# Patient Record
Sex: FEMALE | Race: WHITE | NOT HISPANIC OR LATINO | Employment: UNEMPLOYED | URBAN - NONMETROPOLITAN AREA
[De-identification: names, ages, dates, MRNs, and addresses within clinical notes are randomized per-mention and may not be internally consistent; named-entity substitution may affect disease eponyms.]

---

## 2016-06-22 LAB — EXTERNAL HEPATITIS B SURFACE ANTIGEN: NEGATIVE

## 2017-01-17 ENCOUNTER — HOSPITAL ENCOUNTER (OUTPATIENT)
Facility: HOSPITAL | Age: 27
Discharge: HOME | End: 2017-01-17
Attending: OBSTETRICS & GYNECOLOGY | Admitting: OBSTETRICS & GYNECOLOGY

## 2017-01-17 VITALS
BODY MASS INDEX: 36.47 KG/M2 | OXYGEN SATURATION: 99 % | HEART RATE: 105 BPM | TEMPERATURE: 98.5 F | DIASTOLIC BLOOD PRESSURE: 75 MMHG | WEIGHT: 193 LBS | RESPIRATION RATE: 20 BRPM | SYSTOLIC BLOOD PRESSURE: 117 MMHG

## 2017-01-17 LAB
ALBUMIN SERPL-MCNC: 3.5 G/DL (ref 3.5–5)
ALBUMIN/GLOB SERPL: 1.2 G/DL (ref 1.5–2.5)
ALP SERPL-CCNC: 101 U/L (ref 46–116)
ALT SERPL W P-5'-P-CCNC: 8 U/L (ref 10–36)
ANION GAP SERPL CALCULATED.3IONS-SCNC: 12.1 MMOL/L (ref 3.6–11.2)
AST SERPL-CCNC: 15 U/L (ref 10–30)
BASOPHILS # BLD AUTO: 0.02 10*3/MM3 (ref 0–0.3)
BASOPHILS NFR BLD AUTO: 0.2 % (ref 0–2)
BILIRUB SERPL-MCNC: 0.7 MG/DL (ref 0.2–1.8)
BUN BLD-MCNC: 5 MG/DL (ref 7–21)
BUN/CREAT SERPL: 11.1 (ref 7–25)
CALCIUM SPEC-SCNC: 9 MG/DL (ref 7.7–10)
CHLORIDE SERPL-SCNC: 106 MMOL/L (ref 99–112)
CO2 SERPL-SCNC: 19.9 MMOL/L (ref 24.3–31.9)
CREAT BLD-MCNC: 0.45 MG/DL (ref 0.43–1.29)
DEPRECATED RDW RBC AUTO: 41.5 FL (ref 37–54)
EOSINOPHIL # BLD AUTO: 0.12 10*3/MM3 (ref 0–0.7)
EOSINOPHIL NFR BLD AUTO: 1.2 % (ref 0–5)
ERYTHROCYTE [DISTWIDTH] IN BLOOD BY AUTOMATED COUNT: 14.2 % (ref 11.5–14.5)
EXTERNAL GROUP B STREP ANTIGEN: NEGATIVE
GFR SERPL CREATININE-BSD FRML MDRD: >150 ML/MIN/1.73
GLOBULIN UR ELPH-MCNC: 3 GM/DL
GLUCOSE BLD-MCNC: 90 MG/DL (ref 70–110)
HCT VFR BLD AUTO: 34.3 % (ref 37–47)
HGB BLD-MCNC: 10.9 G/DL (ref 12–16)
IMM GRANULOCYTES # BLD: 0.04 10*3/MM3 (ref 0–0.03)
IMM GRANULOCYTES NFR BLD: 0.4 % (ref 0–0.5)
LYMPHOCYTES # BLD AUTO: 1.76 10*3/MM3 (ref 1–3)
LYMPHOCYTES NFR BLD AUTO: 17.5 % (ref 21–51)
MCH RBC QN AUTO: 26.3 PG (ref 27–33)
MCHC RBC AUTO-ENTMCNC: 31.8 G/DL (ref 33–37)
MCV RBC AUTO: 82.7 FL (ref 80–94)
MONOCYTES # BLD AUTO: 0.91 10*3/MM3 (ref 0.1–0.9)
MONOCYTES NFR BLD AUTO: 9.1 % (ref 0–10)
NEUTROPHILS # BLD AUTO: 7.2 10*3/MM3 (ref 1.4–6.5)
NEUTROPHILS NFR BLD AUTO: 71.6 % (ref 30–70)
OSMOLALITY SERPL CALC.SUM OF ELEC: 272.5 MOSM/KG (ref 273–305)
PLATELET # BLD AUTO: 212 10*3/MM3 (ref 130–400)
PMV BLD AUTO: 10.5 FL (ref 6–10)
POTASSIUM BLD-SCNC: 3.7 MMOL/L (ref 3.5–5.3)
PROT SERPL-MCNC: 6.5 G/DL (ref 6–8)
RBC # BLD AUTO: 4.15 10*6/MM3 (ref 4.2–5.4)
SODIUM BLD-SCNC: 138 MMOL/L (ref 135–153)
URATE SERPL-MCNC: 3.7 MG/DL (ref 2.4–5.7)
WBC NRBC COR # BLD: 10.05 10*3/MM3 (ref 4.5–12.5)

## 2017-01-17 PROCEDURE — 85025 COMPLETE CBC W/AUTO DIFF WBC: CPT | Performed by: OBSTETRICS & GYNECOLOGY

## 2017-01-17 PROCEDURE — 80053 COMPREHEN METABOLIC PANEL: CPT | Performed by: OBSTETRICS & GYNECOLOGY

## 2017-01-17 PROCEDURE — 59025 FETAL NON-STRESS TEST: CPT

## 2017-01-17 PROCEDURE — 84550 ASSAY OF BLOOD/URIC ACID: CPT | Performed by: OBSTETRICS & GYNECOLOGY

## 2017-01-17 PROCEDURE — G0463 HOSPITAL OUTPT CLINIC VISIT: HCPCS

## 2017-01-17 NOTE — NON STRESS TEST
Rachel Escobar, a  at 35w6d with an CONCEPCION of 2/15/2017, by Ultrasound, was seen at [facility] LABOR DELIVERY for a nonstress test.    Chief Complaint   Patient presents with   • Non-stress Test     episode of visual changes, slurred speech and mixed up speech. some numbness on R side.  Episode lasted approx 35 minutes.       Interpretation A  Nonstress Test Interpretation A: Reactive (17 1640 : Tricia Velasquez RN)  Comments A: CONFIRMED WITH LEÓN LEVINE RN AND DR. TALAVERA INFORMED OF FETAL TACHYCARDIA. (17 1640 : Tricia Velasquez RN)

## 2017-01-17 NOTE — IP AVS SNAPSHOT
AFTER VISIT SUMMARY             Rachel Escobar           About your hospitalization     You were discharged on:  January 17, 2017 You last received care in the:  [facility] LABOR DELIVERY     Unit phone number:  [phone]       Medications    If you or your caregiver advised us that you are currently taking a medication and that medication is marked below as “Resume”, this simply indicates that we have reviewed those medications to make sure our new therapy recommendations do not interfere.  If you have concerns about medications other than those new ones which we are prescribing today, please consult the physician who prescribed them (or your primary physician).  Our review of your home medications is not meant to indicate that we are directing their use.             Your Medications      Notice     You have not been prescribed any medications.             Instructions for After Discharge         Relevant Prenatal Information          Facts About Your Prenatal Visit (All Dating Information Is Approximate)     Due Date How Far Along Am I? Weight Gain Since Prior Visit (12/20/2016)       2/15/2017 35 weeks 6 days -1 lb (-0.5 kg)       Vitals     Blood Pressure Weight                117/75 193 lb (87.5 kg)           Summary of Your Hospitalization        Reason for Hospitalization     Your primary diagnosis was:  Not on File      Care Providers     Provider Service Role Specialty    Saniya Be, DO Obstetrics Attending Provider Obstetrics and Gynecology      Your Allergies  Date Reviewed: 1/17/2017    No active allergies      Codingpeople Signup     [facility] Codingpeople allows you to send messages to your doctor, view your test results, renew your prescriptions, schedule appointments, and more. To sign up, go to MDdatacor and click on the Sign Up Now link in the New User? box. Enter your Codingpeople Activation Code exactly as it appears below along with the last four digits of your  Social Security Number and your Date of Birth () to complete the sign-up process. If you do not sign up before the expiration date, you must request a new code.    Met[employer]x Activation Code: [email or web address]  Expires: 2017  6:49 PM    If you have questions, you can email [email or web address] or call [phone] to talk to our Met[employer] staff. Remember, Metconnex is NOT to be used for urgent needs. For medical emergencies, dial 911.          Information Regarding Fetal Kick Counts     Fetal Movement Counts  Patient Name: __________________________________________________   Patient Due Date: ____________________  Performing a fetal movement count is highly recommended in high-risk pregnancies, but it is good for every pregnant woman to do. Your caregiver may ask you to start counting fetal movements at 28 weeks of the pregnancy. Fetal movements often increase:  · After eating a full meal.  · After physical activity.  · After eating or drinking something sweet or cold.  · At rest.  Pay attention to when you feel the baby is most active. This will help you notice a pattern of your baby's sleep and wake cycles and what factors contribute to an increase in fetal movement. It is important to perform a fetal movement count at the same time each day when your baby is normally most active.    HOW TO COUNT FETAL MOVEMENTS  1. Find a quiet and comfortable area to sit or lie down on your left side. Lying on your left side provides the best blood and oxygen circulation to your baby.  2. Write down the day and time on a sheet of paper or in a journal.  3. Start counting kicks, flutters, swishes, rolls, or jabs in a 2 hour period. You should feel at least 10 movements within 2 hours.  4. If you do not feel 10 movements in 2 hours, wait 2-3 hours and count again. Look for a change in the pattern or not enough counts in 2 hours.  SEEK MEDICAL CARE IF:  · You feel less than 10 counts in 2 hours, tried twice.  · There is no  movement in over an hour.  · The pattern is changing or taking longer each day to reach 10 counts in 2 hours.  · You feel the baby is not moving as he or she usually does.    Date  Movements  Start Time  Finish Time    Date  Movements  Start Time  Finish Time    Date  Movements  Start Time  Finish Time    Date  Movements  Start Time  Finish Time    Date  Movements  Start Time  Finish Time    Date  Movements  Start Time  Finish Time      Date  Movements  Start Time  Finish Time    Date  Movements  Start Time  Finish Time    Date  Movements  Start Time  Finish Time    Date  Movements  Start Time  Finish Time    Date  Movements  Start Time  Finish Time    Date  Movements  Start Time  Finish Time      Date  Movements  Start Time  Finish Time    Date  Movements  Start Time  Finish Time    Date  Movements  Start Time  Finish Time    Date  Movements  Start Time  Finish Time    Date  Movements  Start Time  Finish Time    Date  Movements  Start Time  Finish Time    Date  Movements  Start Time  Finish Time    Date  Movements  Start Time  Finish Time    Date  Movements  Start Time  Finish Time    Date  Movements  Start Time  Finish Time    Date  Movements  Start Time  Finish Time    Date  Movements  Start Time  Finish Time      Document Released: 01/17/2008   Document Revised: 12/04/2013   Document Reviewed: 10/14/2013    ExitCare® Patient Information ©2015 [employer], [facility]. This information is not intended to replace advice given to you by your health care provider. Make sure you discuss any questions you have with your health care provider.             SYMPTOMS OF A STROKE    Call 911 or have someone take you to the Emergency Department if you have any of the following:    · Sudden numbness or weakness of your face, arm or leg especially on one side of the body  · Sudden confusion, diffiiculty speaking or trouble understanding   · Changes in your vision or loss of sight in one eye  · Sudden severe headache with no known  cause  · sudden dizziness, trouble walking, loss of balance or coordination    It is important to seek emergency care right away if you have further stroke symptoms. If you get emergency help quickly, the powerful clot-dissolving medicines can reduce the disabilities caused by a stroke.     For more information:    American Stroke Association  8-290-9-STROKE  www.strokeassociation.org           IF YOU SMOKE OR USE TOBACCO PLEASE READ THE FOLLOWING:    Why is smoking bad for me?  Smoking increases the risk of heart disease, lung disease, vascular disease, stroke, and cancer.     If you smoke, STOP!    If you would like more information on quitting smoking, please visit the [employer] website: [email or web address]/healthier-[email or web address]   This link will provide additional resources including the QUIT line and the Beat the Pack support groups.     For more information:    American Cancer Society  (620) [phone]    American Heart Association  8-443-574-4853               YOU ARE THE MOST IMPORTANT FACTOR IN YOUR RECOVERY.     Follow all instructions carefully.     I have reviewed my discharge instructions with my nurse, including the following information, if applicable:     Information about my illness and diagnosis   Follow up appointments (including lab draws)   Wound Care   Equipment Needs   Medications (new and continuing) along with side effects   Preventative information such as vaccines and smoking cessations   Diet   Pain   I know when to contact my Doctor's office or seek emergency care      I want my nurse to describe the side effects of my medications: YES NO   If the answer is no, I understand the side effects of my medications: YES NO   My nurse described the side effects of my medications in a way that I could understand: YES NO   I have taken my personal belongings and my own medications with me at discharge: YES NO            Should a home visit be schedule with you:  a  notification from your provider will be made prior to the visit.  If you have any questions or concerns about the visit, contact your provider.      I have received this information and my questions have been answered. I have discussed any concerns I see with this plan with the nurse or physician. I understand these instructions.    Signature of Patient or Responsible Person: _____________________________________    Date: _________________  Time: __________________    Signature of Healthcare Provider: _______________________________________  Date: _________________  Time: __________________

## 2017-01-17 NOTE — DISCHARGE INSTRUCTIONS
Go immediately to the closest Emergency Department if you begin experiencing symptoms like you had earlier today.

## 2017-01-26 ENCOUNTER — HOSPITAL ENCOUNTER (OUTPATIENT)
Facility: HOSPITAL | Age: 27
Discharge: HOME | End: 2017-01-26
Attending: OBSTETRICS & GYNECOLOGY | Admitting: OBSTETRICS & GYNECOLOGY

## 2017-01-26 VITALS
HEIGHT: 61 IN | WEIGHT: 194 LBS | TEMPERATURE: 98.4 F | SYSTOLIC BLOOD PRESSURE: 124 MMHG | DIASTOLIC BLOOD PRESSURE: 69 MMHG | HEART RATE: 87 BPM | RESPIRATION RATE: 22 BRPM | BODY MASS INDEX: 36.63 KG/M2

## 2017-01-26 LAB — A1 MICROGLOB PLACENTAL VAG QL: NEGATIVE

## 2017-01-26 PROCEDURE — 84112 EVAL AMNIOTIC FLUID PROTEIN: CPT | Performed by: OBSTETRICS & GYNECOLOGY

## 2017-01-26 PROCEDURE — 59025 FETAL NON-STRESS TEST: CPT

## 2017-01-26 PROCEDURE — G0463 HOSPITAL OUTPT CLINIC VISIT: HCPCS

## 2017-01-26 NOTE — NON STRESS TEST
Rachel Escobar, a  at 37w1d with an CONCEPCION of 2/15/2017, by Ultrasound, was seen at [facility] LABOR DELIVERY for a nonstress test.    Chief Complaint   Patient presents with   • Non-stress Test     PT C/O OF LEAKING OF CLEAR FLUID.  PT DENIES ANY UC'S AND NO VAG BLEEDING.  PT HAS +FM.       Interpretation A  Nonstress Test Interpretation A: Reactive (172 : Sasha Boston RN)        VERIFIED BY TRACIE VALENCIA RN

## 2017-01-26 NOTE — IP AVS SNAPSHOT
AFTER VISIT SUMMARY             Rachel Escobar           About your hospitalization     You were discharged on:  January 26, 2017 You last received care in the:  [facility] LABOR DELIVERY     Unit phone number:  [phone]       Medications    If you or your caregiver advised us that you are currently taking a medication and that medication is marked below as “Resume”, this simply indicates that we have reviewed those medications to make sure our new therapy recommendations do not interfere.  If you have concerns about medications other than those new ones which we are prescribing today, please consult the physician who prescribed them (or your primary physician).  Our review of your home medications is not meant to indicate that we are directing their use.             Your Medications      Notice     You have not been prescribed any medications.               Your Medications      Notice     You have not been prescribed any medications.             Instructions for After Discharge         Relevant Prenatal Information          Facts About Your Prenatal Visit (All Dating Information Is Approximate)     Due Date How Far Along Am I? Weight Gain Since Prior Visit (1/17/2017)       2/15/2017 37 weeks 1 days 1 lb (0.5 kg)       Vitals     Blood Pressure Weight                124/69 194 lb (88 kg)           Summary of Your Hospitalization        Reason for Hospitalization     Your primary diagnosis was:  Not on File      Care Providers     Provider Service Role Specialty    Saniya Be, DO Obstetrics Attending Provider Obstetrics and Gynecology      Your Allergies  Date Reviewed: 1/26/2017    No active allergies      [email or web address] Signup     [facility] [email or web address] allows you to send messages to your doctor, view your test results, renew your prescriptions, schedule appointments, and more. To sign up, go to Fileblaze and click on the Sign Up Now link in the New User? box. Enter your  MyMundus Activation Code exactly as it appears below along with the last four digits of your Social Security Number and your Date of Birth () to complete the sign-up process. If you do not sign up before the expiration date, you must request a new code.    MyMundus Activation Code: [email or web address]  Expires: 2017  6:49 PM    If you have questions, you can email [email or web address] or call [phone] to talk to our MyMund[employer] staff. Remember, MyMundus is NOT to be used for urgent needs. For medical emergencies, dial 911.          Information Regarding Fetal Kick Counts     Fetal Movement Counts  Patient Name: __________________________________________________   Patient Due Date: ____________________  Performing a fetal movement count is highly recommended in high-risk pregnancies, but it is good for every pregnant woman to do. Your caregiver may ask you to start counting fetal movements at 28 weeks of the pregnancy. Fetal movements often increase:  · After eating a full meal.  · After physical activity.  · After eating or drinking something sweet or cold.  · At rest.  Pay attention to when you feel the baby is most active. This will help you notice a pattern of your baby's sleep and wake cycles and what factors contribute to an increase in fetal movement. It is important to perform a fetal movement count at the same time each day when your baby is normally most active.    HOW TO COUNT FETAL MOVEMENTS  1. Find a quiet and comfortable area to sit or lie down on your left side. Lying on your left side provides the best blood and oxygen circulation to your baby.  2. Write down the day and time on a sheet of paper or in a journal.  3. Start counting kicks, flutters, swishes, rolls, or jabs in a 2 hour period. You should feel at least 10 movements within 2 hours.  4. If you do not feel 10 movements in 2 hours, wait 2-3 hours and count again. Look for a change in the pattern or not enough counts in 2  hours.  SEEK MEDICAL CARE IF:  · You feel less than 10 counts in 2 hours, tried twice.  · There is no movement in over an hour.  · The pattern is changing or taking longer each day to reach 10 counts in 2 hours.  · You feel the baby is not moving as he or she usually does.    Date  Movements  Start Time  Finish Time    Date  Movements  Start Time  Finish Time    Date  Movements  Start Time  Finish Time    Date  Movements  Start Time  Finish Time    Date  Movements  Start Time  Finish Time    Date  Movements  Start Time  Finish Time      Date  Movements  Start Time  Finish Time    Date  Movements  Start Time  Finish Time    Date  Movements  Start Time  Finish Time    Date  Movements  Start Time  Finish Time    Date  Movements  Start Time  Finish Time    Date  Movements  Start Time  Finish Time      Date  Movements  Start Time  Finish Time    Date  Movements  Start Time  Finish Time    Date  Movements  Start Time  Finish Time    Date  Movements  Start Time  Finish Time    Date  Movements  Start Time  Finish Time    Date  Movements  Start Time  Finish Time    Date  Movements  Start Time  Finish Time    Date  Movements  Start Time  Finish Time    Date  Movements  Start Time  Finish Time    Date  Movements  Start Time  Finish Time    Date  Movements  Start Time  Finish Time    Date  Movements  Start Time  Finish Time      Document Released: 01/17/2008   Document Revised: 12/04/2013   Document Reviewed: 10/14/2013    ExitCare® Patient Information ©2015 [employer], [facility]. This information is not intended to replace advice given to you by your health care provider. Make sure you discuss any questions you have with your health care provider.             SYMPTOMS OF A STROKE    Call 911 or have someone take you to the Emergency Department if you have any of the following:    · Sudden numbness or weakness of your face, arm or leg especially on one side of the body  · Sudden confusion, diffiiculty speaking or trouble understanding    · Changes in your vision or loss of sight in one eye  · Sudden severe headache with no known cause  · sudden dizziness, trouble walking, loss of balance or coordination    It is important to seek emergency care right away if you have further stroke symptoms. If you get emergency help quickly, the powerful clot-dissolving medicines can reduce the disabilities caused by a stroke.     For more information:    American Stroke Association  5-016-7-STROKE  www.strokeassociation.org           IF YOU SMOKE OR USE TOBACCO PLEASE READ THE FOLLOWING:    Why is smoking bad for me?  Smoking increases the risk of heart disease, lung disease, vascular disease, stroke, and cancer.     If you smoke, STOP!    If you would like more information on quitting smoking, please visit the [employer] website: [email or web address]   This link will provide additional resources including the QUIT line and the Beat the Pack support groups.     For more information:    American Cancer Society  ([phone] 555-6100    American Heart Association  1-285.672.9043               YOU ARE THE MOST IMPORTANT FACTOR IN YOUR RECOVERY.     Follow all instructions carefully.     I have reviewed my discharge instructions with my nurse, including the following information, if applicable:     Information about my illness and diagnosis   Follow up appointments (including lab draws)   Wound Care   Equipment Needs   Medications (new and continuing) along with side effects   Preventative information such as vaccines and smoking cessations   Diet   Pain   I know when to contact my Doctor's office or seek emergency care      I want my nurse to describe the side effects of my medications: YES NO   If the answer is no, I understand the side effects of my medications: YES NO   My nurse described the side effects of my medications in a way that I could understand: YES NO   I have taken my personal belongings and my own  medications with me at discharge: YES NO            Should a home visit be schedule with you:  a notification from your provider will be made prior to the visit.  If you have any questions or concerns about the visit, contact your provider.      I have received this information and my questions have been answered. I have discussed any concerns I see with this plan with the nurse or physician. I understand these instructions.    Signature of Patient or Responsible Person: _____________________________________    Date: _________________  Time: __________________    Signature of Healthcare Provider: _______________________________________  Date: _________________  Time: __________________

## 2017-01-30 ENCOUNTER — LAB (OUTPATIENT)
Dept: LAB | Facility: HOSPITAL | Age: 27
End: 2017-01-30
Attending: OBSTETRICS & GYNECOLOGY

## 2017-01-30 ENCOUNTER — TRANSCRIBE ORDERS (OUTPATIENT)
Dept: ADMINISTRATIVE | Facility: HOSPITAL | Age: 27
End: 2017-01-30

## 2017-01-30 DIAGNOSIS — Z34.80 PRENATAL CARE, SUBSEQUENT PREGNANCY, UNSPECIFIED TRIMESTER: Primary | ICD-10-CM

## 2017-01-30 DIAGNOSIS — Z34.80 PRENATAL CARE, SUBSEQUENT PREGNANCY, UNSPECIFIED TRIMESTER: ICD-10-CM

## 2017-01-30 LAB
ALBUMIN SERPL-MCNC: 3.7 G/DL (ref 3.5–5)
ALP SERPL-CCNC: 130 U/L (ref 46–116)
ALT SERPL W P-5'-P-CCNC: 10 U/L (ref 10–36)
AST SERPL-CCNC: 16 U/L (ref 10–30)
BASOPHILS # BLD AUTO: 0.01 10*3/MM3 (ref 0–0.3)
BASOPHILS NFR BLD AUTO: 0.1 % (ref 0–2)
BILIRUB CONJ SERPL-MCNC: 0.1 MG/DL (ref 0–0.2)
BILIRUB INDIRECT SERPL-MCNC: 0.8 MG/DL
BILIRUB SERPL-MCNC: 0.9 MG/DL (ref 0.2–1.8)
DEPRECATED RDW RBC AUTO: 41.9 FL (ref 37–54)
EOSINOPHIL # BLD AUTO: 0.08 10*3/MM3 (ref 0–0.7)
EOSINOPHIL NFR BLD AUTO: 0.7 % (ref 0–5)
ERYTHROCYTE [DISTWIDTH] IN BLOOD BY AUTOMATED COUNT: 14.8 % (ref 11.5–14.5)
HCT VFR BLD AUTO: 34.2 % (ref 37–47)
HGB BLD-MCNC: 10.8 G/DL (ref 12–16)
IMM GRANULOCYTES # BLD: 0.05 10*3/MM3 (ref 0–0.03)
IMM GRANULOCYTES NFR BLD: 0.4 % (ref 0–0.5)
LYMPHOCYTES # BLD AUTO: 1.91 10*3/MM3 (ref 1–3)
LYMPHOCYTES NFR BLD AUTO: 17 % (ref 21–51)
MCH RBC QN AUTO: 25.3 PG (ref 27–33)
MCHC RBC AUTO-ENTMCNC: 31.6 G/DL (ref 33–37)
MCV RBC AUTO: 80.1 FL (ref 80–94)
MONOCYTES # BLD AUTO: 1.02 10*3/MM3 (ref 0.1–0.9)
MONOCYTES NFR BLD AUTO: 9.1 % (ref 0–10)
NEUTROPHILS # BLD AUTO: 8.15 10*3/MM3 (ref 1.4–6.5)
NEUTROPHILS NFR BLD AUTO: 72.7 % (ref 30–70)
PLATELET # BLD AUTO: 248 10*3/MM3 (ref 130–400)
PMV BLD AUTO: 10.5 FL (ref 6–10)
PROT SERPL-MCNC: 6.6 G/DL (ref 6–8)
RBC # BLD AUTO: 4.27 10*6/MM3 (ref 4.2–5.4)
URATE SERPL-MCNC: 4.3 MG/DL (ref 2.4–5.7)
WBC NRBC COR # BLD: 11.22 10*3/MM3 (ref 4.5–12.5)

## 2017-01-30 PROCEDURE — 80076 HEPATIC FUNCTION PANEL: CPT | Performed by: OBSTETRICS & GYNECOLOGY

## 2017-01-30 PROCEDURE — 85025 COMPLETE CBC W/AUTO DIFF WBC: CPT | Performed by: OBSTETRICS & GYNECOLOGY

## 2017-01-30 PROCEDURE — 36415 COLL VENOUS BLD VENIPUNCTURE: CPT

## 2017-01-30 PROCEDURE — 84550 ASSAY OF BLOOD/URIC ACID: CPT | Performed by: OBSTETRICS & GYNECOLOGY

## 2017-01-31 ENCOUNTER — TRANSCRIBE ORDERS (OUTPATIENT)
Dept: ADMINISTRATIVE | Facility: HOSPITAL | Age: 27
End: 2017-01-31

## 2017-01-31 ENCOUNTER — LAB (OUTPATIENT)
Dept: LAB | Facility: HOSPITAL | Age: 27
End: 2017-01-31
Attending: OBSTETRICS & GYNECOLOGY

## 2017-01-31 DIAGNOSIS — Z34.80 PRENATAL CARE, SUBSEQUENT PREGNANCY, UNSPECIFIED TRIMESTER: ICD-10-CM

## 2017-01-31 DIAGNOSIS — Z34.80 PRENATAL CARE, SUBSEQUENT PREGNANCY, UNSPECIFIED TRIMESTER: Primary | ICD-10-CM

## 2017-01-31 LAB
COLLECT DURATION TIME UR: 24 HRS
MICRO TOTAL PROTEIN: 14.1 MG/DL
MICROALBUMIN 24H MFR UR: 105.75 MG/24 HR (ref 0–29)
SPECIMEN VOL 24H UR: 750 ML

## 2017-01-31 PROCEDURE — 81050 URINALYSIS VOLUME MEASURE: CPT | Performed by: OBSTETRICS & GYNECOLOGY

## 2017-01-31 PROCEDURE — 84156 ASSAY OF PROTEIN URINE: CPT | Performed by: OBSTETRICS & GYNECOLOGY

## 2017-02-06 DIAGNOSIS — Z3A.39 39 WEEKS GESTATION OF PREGNANCY: Primary | ICD-10-CM

## 2017-02-06 RX ORDER — IBUPROFEN 800 MG/1
800 TABLET ORAL EVERY 8 HOURS SCHEDULED
Status: CANCELLED | OUTPATIENT
Start: 2017-02-06

## 2017-02-06 RX ORDER — METHYLERGONOVINE MALEATE 0.2 MG/ML
200 INJECTION INTRAVENOUS AS NEEDED
Status: CANCELLED | OUTPATIENT
Start: 2017-02-06

## 2017-02-06 RX ORDER — MISOPROSTOL 200 UG/1
600 TABLET ORAL AS NEEDED
Status: CANCELLED | OUTPATIENT
Start: 2017-02-06

## 2017-02-06 RX ORDER — CARBOPROST TROMETHAMINE 250 UG/ML
250 INJECTION, SOLUTION INTRAMUSCULAR AS NEEDED
Status: CANCELLED | OUTPATIENT
Start: 2017-02-06

## 2017-02-06 RX ORDER — PROMETHAZINE HYDROCHLORIDE 25 MG/1
12.5 TABLET ORAL EVERY 6 HOURS PRN
Status: CANCELLED | OUTPATIENT
Start: 2017-02-06

## 2017-02-06 RX ORDER — LIDOCAINE HYDROCHLORIDE 10 MG/ML
5 INJECTION, SOLUTION INFILTRATION; PERINEURAL AS NEEDED
Status: CANCELLED | OUTPATIENT
Start: 2017-02-06

## 2017-02-06 RX ORDER — SODIUM CHLORIDE 0.9 % (FLUSH) 0.9 %
1-10 SYRINGE (ML) INJECTION AS NEEDED
Status: CANCELLED | OUTPATIENT
Start: 2017-02-06

## 2017-02-06 RX ORDER — ACETAMINOPHEN 325 MG/1
650 TABLET ORAL EVERY 4 HOURS PRN
Status: CANCELLED | OUTPATIENT
Start: 2017-02-06

## 2017-02-06 RX ORDER — PROMETHAZINE HYDROCHLORIDE 25 MG/ML
12.5 INJECTION, SOLUTION INTRAMUSCULAR; INTRAVENOUS EVERY 6 HOURS PRN
Status: CANCELLED | OUTPATIENT
Start: 2017-02-06

## 2017-02-06 RX ORDER — PROMETHAZINE HYDROCHLORIDE 12.5 MG/1
12.5 SUPPOSITORY RECTAL EVERY 6 HOURS PRN
Status: CANCELLED | OUTPATIENT
Start: 2017-02-06

## 2017-02-06 RX ORDER — TERBUTALINE SULFATE 1 MG/ML
0.25 INJECTION, SOLUTION SUBCUTANEOUS AS NEEDED
Status: CANCELLED | OUTPATIENT
Start: 2017-02-06

## 2017-02-06 NOTE — H&P
Chief complaint Induction of Labor.     History of Present Illness: Patient is a 26 y.o. female who presents with IUP at 38w6d weeks gestation. G 2, P 0, A1.       PAST MEDICAL HISTORY   Rubella Non Immune    Blood Type: A +  Fetal Gender: Female  GBS: Negative    Past Surgical History   Procedure Laterality Date   • Tonsillectomy       Family History   Problem Relation Age of Onset   • Diabetes Father    • Hypertension Father      Social History   Substance Use Topics   • Smoking status: Never Smoker   • Smokeless tobacco: Not on file   • Alcohol use No       (Not in a hospital admission)  Allergies:  Review of patient's allergies indicates no known allergies.      Vital Signs   Height 61 in  Weight 197 lbs  /75  BMI 37  Pulse 130  Rep 16  Temp 97.4  Pulse Ox 98    Radiology  Imaging Results (last 24 hours)     ** No results found for the last 24 hours. **          Labs  Lab Results (last 24 hours)     ** No results found for the last 24 hours. **            Review of Systems    The following systems were reviewed and negative;  ENT, respiratory, cardiovascular, gastrointestinal, genitourinary, breast, endocrine and allergies / immunologic.      Physical Exam:      General Appearance:    Alert, cooperative, in no acute distress   Head:    Normocephalic, without obvious abnormality, atraumatic   Eyes:            Lids and lashes normal, conjunctivae and sclerae normal, no   icterus, no pallor, corneas clear, PERRLA   Ears:    Ears appear intact with no abnormalities noted   Throat:   No oral lesions, no thrush, oral mucosa moist   Neck:   No adenopathy, supple, trachea midline, no thyromegaly, no     carotid bruit, no JVD   Back:     No kyphosis present, no scoliosis present, no skin lesions,       erythema or scars, no tenderness to percussion or                   palpation,   range of motion normal   Lungs:     Clear to auscultation,respirations regular, even and                   unlabored    Heart:     Regular rhythm and normal rate, normal S1 and S2, no            murmur, no gallop, no rub, no click   Breast Exam:    Deferred   Abdomen:     Normal bowel sounds, no masses, no organomegaly, soft        non-tender, non-distended, no guarding, no rebound                 tenderness   Genitalia:    Deferred   Extremities:   Moves all extremities well, no edema, no cyanosis, no              redness   Pulses:   Pulses palpable and equal bilaterally   Skin:   No bleeding, bruising or rash   Lymph nodes:   No palpable adenopathy   Neurologic:   Cranial nerves 2 - 12 grossly intact, sensation intact, DTR        present and equal bilaterally         Assessment: Patient is a 26 y.o. female who presents with IUP at 38w6d weeks gestation. G 2, P 0, A1.    Plan of Care: Admit. Proceed with an induction of labor.     Eddie Garcia III, MD  02/06/17  8:14 AM

## 2017-02-07 ENCOUNTER — HOSPITAL ENCOUNTER (INPATIENT)
Facility: HOSPITAL | Age: 27
LOS: 4 days | Discharge: HOME | End: 2017-02-11
Attending: OBSTETRICS & GYNECOLOGY | Admitting: OBSTETRICS & GYNECOLOGY

## 2017-02-07 ENCOUNTER — HOSPITAL ENCOUNTER (OUTPATIENT)
Dept: LABOR AND DELIVERY | Facility: HOSPITAL | Age: 27
Discharge: HOME | End: 2017-02-07

## 2017-02-07 DIAGNOSIS — Z3A.39 39 WEEKS GESTATION OF PREGNANCY: ICD-10-CM

## 2017-02-07 LAB
ABO GROUP BLD: NORMAL
BLD GP AB SCN SERPL QL: NEGATIVE
DEPRECATED RDW RBC AUTO: 43.4 FL (ref 37–54)
ERYTHROCYTE [DISTWIDTH] IN BLOOD BY AUTOMATED COUNT: 14.8 % (ref 11.5–14.5)
HCT VFR BLD AUTO: 33.5 % (ref 37–47)
HGB BLD-MCNC: 10.8 G/DL (ref 12–16)
MCH RBC QN AUTO: 25.9 PG (ref 27–33)
MCHC RBC AUTO-ENTMCNC: 32.2 G/DL (ref 33–37)
MCV RBC AUTO: 80.3 FL (ref 80–94)
PLATELET # BLD AUTO: 195 10*3/MM3 (ref 130–400)
PMV BLD AUTO: 10.8 FL (ref 6–10)
RBC # BLD AUTO: 4.17 10*6/MM3 (ref 4.2–5.4)
RH BLD: POSITIVE
WBC NRBC COR # BLD: 10.05 10*3/MM3 (ref 4.5–12.5)

## 2017-02-07 PROCEDURE — 59025 FETAL NON-STRESS TEST: CPT

## 2017-02-07 PROCEDURE — 86901 BLOOD TYPING SEROLOGIC RH(D): CPT

## 2017-02-07 PROCEDURE — 85027 COMPLETE CBC AUTOMATED: CPT | Performed by: OBSTETRICS & GYNECOLOGY

## 2017-02-07 PROCEDURE — 25010000002 BUTORPHANOL PER 1 MG

## 2017-02-07 PROCEDURE — 86900 BLOOD TYPING SEROLOGIC ABO: CPT

## 2017-02-07 PROCEDURE — 25010000002 ONDANSETRON PER 1 MG

## 2017-02-07 PROCEDURE — 25010000002 PROMETHAZINE PER 50 MG: Performed by: OBSTETRICS & GYNECOLOGY

## 2017-02-07 PROCEDURE — 86850 RBC ANTIBODY SCREEN: CPT

## 2017-02-07 RX ORDER — PROMETHAZINE HYDROCHLORIDE 25 MG/1
12.5 TABLET ORAL EVERY 6 HOURS PRN
Status: DISCONTINUED | OUTPATIENT
Start: 2017-02-07 | End: 2017-02-08 | Stop reason: HOSPADM

## 2017-02-07 RX ORDER — CARBOPROST TROMETHAMINE 250 UG/ML
250 INJECTION, SOLUTION INTRAMUSCULAR AS NEEDED
Status: DISCONTINUED | OUTPATIENT
Start: 2017-02-07 | End: 2017-02-08 | Stop reason: HOSPADM

## 2017-02-07 RX ORDER — METHYLERGONOVINE MALEATE 0.2 MG/ML
200 INJECTION INTRAVENOUS AS NEEDED
Status: DISCONTINUED | OUTPATIENT
Start: 2017-02-07 | End: 2017-02-08 | Stop reason: HOSPADM

## 2017-02-07 RX ORDER — ONDANSETRON 2 MG/ML
INJECTION INTRAMUSCULAR; INTRAVENOUS
Status: COMPLETED
Start: 2017-02-07 | End: 2017-02-07

## 2017-02-07 RX ORDER — OXYTOCIN/RINGER'S LACTATE 20/1000 ML
2 PLASTIC BAG, INJECTION (ML) INTRAVENOUS CONTINUOUS
Status: DISCONTINUED | OUTPATIENT
Start: 2017-02-07 | End: 2017-02-08

## 2017-02-07 RX ORDER — SODIUM CHLORIDE 0.9 % (FLUSH) 0.9 %
1-10 SYRINGE (ML) INJECTION AS NEEDED
Status: DISCONTINUED | OUTPATIENT
Start: 2017-02-07 | End: 2017-02-08 | Stop reason: HOSPADM

## 2017-02-07 RX ORDER — SODIUM CHLORIDE, SODIUM LACTATE, POTASSIUM CHLORIDE, CALCIUM CHLORIDE 600; 310; 30; 20 MG/100ML; MG/100ML; MG/100ML; MG/100ML
125 INJECTION, SOLUTION INTRAVENOUS CONTINUOUS
Status: DISCONTINUED | OUTPATIENT
Start: 2017-02-07 | End: 2017-02-11 | Stop reason: HOSPADM

## 2017-02-07 RX ORDER — TERBUTALINE SULFATE 1 MG/ML
0.25 INJECTION, SOLUTION SUBCUTANEOUS AS NEEDED
Status: DISCONTINUED | OUTPATIENT
Start: 2017-02-07 | End: 2017-02-08 | Stop reason: HOSPADM

## 2017-02-07 RX ORDER — GLYCERIN/PROPYLENE GLYCOL/WATR
1 SOLUTION, NON-ORAL VAGINAL AS NEEDED
Status: DISCONTINUED | OUTPATIENT
Start: 2017-02-07 | End: 2017-02-08

## 2017-02-07 RX ORDER — MISOPROSTOL 100 UG/1
600 TABLET ORAL AS NEEDED
Status: DISCONTINUED | OUTPATIENT
Start: 2017-02-07 | End: 2017-02-08 | Stop reason: HOSPADM

## 2017-02-07 RX ORDER — BUTORPHANOL TARTRATE 1 MG/ML
1 INJECTION, SOLUTION INTRAMUSCULAR; INTRAVENOUS 3 TIMES DAILY PRN
Status: DISCONTINUED | OUTPATIENT
Start: 2017-02-07 | End: 2017-02-08

## 2017-02-07 RX ORDER — LIDOCAINE HYDROCHLORIDE 10 MG/ML
5 INJECTION, SOLUTION INFILTRATION; PERINEURAL AS NEEDED
Status: DISCONTINUED | OUTPATIENT
Start: 2017-02-07 | End: 2017-02-08 | Stop reason: HOSPADM

## 2017-02-07 RX ORDER — ACETAMINOPHEN 325 MG/1
650 TABLET ORAL EVERY 4 HOURS PRN
Status: DISCONTINUED | OUTPATIENT
Start: 2017-02-07 | End: 2017-02-08 | Stop reason: HOSPADM

## 2017-02-07 RX ORDER — PROMETHAZINE HYDROCHLORIDE 12.5 MG/1
12.5 SUPPOSITORY RECTAL EVERY 6 HOURS PRN
Status: DISCONTINUED | OUTPATIENT
Start: 2017-02-07 | End: 2017-02-08 | Stop reason: HOSPADM

## 2017-02-07 RX ORDER — IBUPROFEN 800 MG/1
800 TABLET ORAL EVERY 8 HOURS SCHEDULED
Status: DISCONTINUED | OUTPATIENT
Start: 2017-02-07 | End: 2017-02-08 | Stop reason: HOSPADM

## 2017-02-07 RX ORDER — MISOPROSTOL 100 UG/1
25 TABLET ORAL
Status: DISCONTINUED | OUTPATIENT
Start: 2017-02-07 | End: 2017-02-08

## 2017-02-07 RX ORDER — PROMETHAZINE HYDROCHLORIDE 25 MG/ML
12.5 INJECTION, SOLUTION INTRAMUSCULAR; INTRAVENOUS EVERY 6 HOURS PRN
Status: DISCONTINUED | OUTPATIENT
Start: 2017-02-07 | End: 2017-02-08 | Stop reason: HOSPADM

## 2017-02-07 RX ORDER — MAGNESIUM HYDROXIDE 1200 MG/15ML
3000 LIQUID ORAL ONCE
Status: DISCONTINUED | OUTPATIENT
Start: 2017-02-07 | End: 2017-02-11 | Stop reason: HOSPADM

## 2017-02-07 RX ORDER — ZOLPIDEM TARTRATE 5 MG/1
5 TABLET ORAL NIGHTLY PRN
Status: DISCONTINUED | OUTPATIENT
Start: 2017-02-07 | End: 2017-02-08 | Stop reason: SDUPTHER

## 2017-02-07 RX ORDER — BUTORPHANOL TARTRATE 1 MG/ML
INJECTION, SOLUTION INTRAMUSCULAR; INTRAVENOUS
Status: COMPLETED
Start: 2017-02-07 | End: 2017-02-07

## 2017-02-07 RX ADMIN — BUTORPHANOL TARTRATE 1 MG: 1 INJECTION, SOLUTION INTRAMUSCULAR; INTRAVENOUS at 21:58

## 2017-02-07 RX ADMIN — MISOPROSTOL 25 MCG: 100 TABLET ORAL at 22:38

## 2017-02-07 RX ADMIN — ACETAMINOPHEN 650 MG: 325 TABLET ORAL at 15:53

## 2017-02-07 RX ADMIN — SODIUM CHLORIDE, POTASSIUM CHLORIDE, SODIUM LACTATE AND CALCIUM CHLORIDE 125 ML/HR: 600; 310; 30; 20 INJECTION, SOLUTION INTRAVENOUS at 14:03

## 2017-02-07 RX ADMIN — ONDANSETRON 4 MG: 2 INJECTION, SOLUTION INTRAMUSCULAR; INTRAVENOUS at 15:54

## 2017-02-07 RX ADMIN — ZOLPIDEM TARTRATE 5 MG: 5 TABLET, FILM COATED ORAL at 21:57

## 2017-02-07 RX ADMIN — SODIUM CHLORIDE, POTASSIUM CHLORIDE, SODIUM LACTATE AND CALCIUM CHLORIDE 125 ML/HR: 600; 310; 30; 20 INJECTION, SOLUTION INTRAVENOUS at 07:22

## 2017-02-07 RX ADMIN — MISOPROSTOL 25 MCG: 100 TABLET ORAL at 13:54

## 2017-02-07 RX ADMIN — SODIUM CHLORIDE, POTASSIUM CHLORIDE, SODIUM LACTATE AND CALCIUM CHLORIDE 1000 ML: 600; 310; 30; 20 INJECTION, SOLUTION INTRAVENOUS at 07:22

## 2017-02-07 RX ADMIN — MISOPROSTOL 25 MCG: 100 TABLET ORAL at 10:47

## 2017-02-07 RX ADMIN — MISOPROSTOL 25 MCG: 100 TABLET ORAL at 07:45

## 2017-02-07 RX ADMIN — PROMETHAZINE HYDROCHLORIDE 12.5 MG: 25 INJECTION INTRAMUSCULAR; INTRAVENOUS at 21:57

## 2017-02-07 NOTE — H&P
Chief complaint Induction of Labor.     History of Present Illness: Patient is a 26 y.o. female who presents with IUP at 40w5d weeks gestation. G 2, P 0, A1.       PAST MEDICAL HISTORY   Rubella Non Immune    Blood Type: A +  Fetal Gender: Female  GBS: Negative    Past Surgical History   Procedure Laterality Date   • Tonsillectomy       Family History   Problem Relation Age of Onset   • Diabetes Father    • Hypertension Father      Social History   Substance Use Topics   • Smoking status: Never Smoker   • Smokeless tobacco: Never Used   • Alcohol use No     No prescriptions prior to admission.     Allergies:  Review of patient's allergies indicates no known allergies.      Vital Signs  Temp:  [97.5 °F (36.4 °C)] 97.5 °F (36.4 °C)  Heart Rate:  [109-136] 126  Resp:  [22] 22  BP: (116-144)/(68-90) 134/71Height 61 in  Weight 197 lbs  /75  BMI 37  Pulse 130  Rep 16  Temp 97.4  Pulse Ox 98    Radiology  Imaging Results (last 24 hours)     ** No results found for the last 24 hours. **          Labs  Lab Results (last 24 hours)     Procedure Component Value Units Date/Time    Group B Strep Culture [[id number]] Resulted:  01/17/17     Specimen:  Swab Updated:  02/07/17 0613     External Strep Group B Ag Negative     CBC (No Diff) [[id number]]  (Abnormal) Collected:  02/07/17 0640    Specimen:  Blood Updated:  02/07/17 0703     WBC 10.05 10*3/mm3      RBC 4.17 (L) 10*6/mm3      Hemoglobin 10.8 (L) g/dL      Hematocrit 33.5 (L) %      MCV 80.3 fL      MCH 25.9 (L) pg      MCHC 32.2 (L) g/dL      RDW 14.8 (H) %      RDW-SD 43.4 fl      MPV 10.8 (H) fL      Platelets 195 10*3/mm3             Review of Systems    The following systems were reviewed and negative;  ENT, respiratory, cardiovascular, gastrointestinal, genitourinary, breast, endocrine and allergies / immunologic.      Physical Exam:      General Appearance:    Alert, cooperative, in no acute distress   Head:    Normocephalic, without obvious abnormality,  atraumatic   Eyes:            Lids and lashes normal, conjunctivae and sclerae normal, no   icterus, no pallor, corneas clear, PERRLA   Ears:    Ears appear intact with no abnormalities noted   Throat:   No oral lesions, no thrush, oral mucosa moist   Neck:   No adenopathy, supple, trachea midline, no thyromegaly, no     carotid bruit, no JVD   Back:     No kyphosis present, no scoliosis present, no skin lesions,       erythema or scars, no tenderness to percussion or                   palpation,   range of motion normal   Lungs:     Clear to auscultation,respirations regular, even and                   unlabored    Heart:    Regular rhythm and normal rate, normal S1 and S2, no            murmur, no gallop, no rub, no click   Breast Exam:    Deferred   Abdomen:     Normal bowel sounds, no masses, no organomegaly, soft        non-tender, non-distended, no guarding, no rebound                 tenderness   Genitalia:    Deferred   Extremities:   Moves all extremities well, no edema, no cyanosis, no              redness   Pulses:   Pulses palpable and equal bilaterally   Skin:   No bleeding, bruising or rash   Lymph nodes:   No palpable adenopathy   Neurologic:   Cranial nerves 2 - 12 grossly intact, sensation intact, DTR        present and equal bilaterally         Assessment: Patient is a 26 y.o. female who presents with IUP at 40w5d weeks gestation. G 2, P 0, A1. Patient wants to proceed with an IOL. Patient understands that her cervix may not be favorable at this time. Patient understands that an IOL may resutl in a  section.     Plan of Care: Admit. Proceed with an induction of labor.     Eddie Garcia III, MD  17  9:23 AM

## 2017-02-07 NOTE — NON STRESS TEST
Rachel Escobar, a  at 38w6d with an CONCEPCION of 2/15/2017, by Ultrasound, was seen at [facility] LABOR DELIVERY for a nonstress test.    Chief Complaint   Patient presents with   • Scheduled Induction       Interpretation A  Nonstress Test Interpretation A: Reactive (17 : Bobbi Nye, RN)  Comments A: Verified by JS Celaya RN (17 : Bobbi Nye, HILDA)

## 2017-02-08 ENCOUNTER — ANESTHESIA EVENT (OUTPATIENT)
Dept: LABOR AND DELIVERY | Facility: HOSPITAL | Age: 27
End: 2017-02-08

## 2017-02-08 ENCOUNTER — ANESTHESIA (OUTPATIENT)
Dept: LABOR AND DELIVERY | Facility: HOSPITAL | Age: 27
End: 2017-02-08

## 2017-02-08 PROBLEM — Z3A.39 39 WEEKS GESTATION OF PREGNANCY: Status: ACTIVE | Noted: 2017-02-08

## 2017-02-08 PROCEDURE — C1755 CATHETER, INTRASPINAL: HCPCS

## 2017-02-08 PROCEDURE — 25010000002 FENTANYL CITRATE (PF) 100 MCG/2ML SOLUTION: Performed by: NURSE ANESTHETIST, CERTIFIED REGISTERED

## 2017-02-08 PROCEDURE — 25010000002 ROPIVACAINE PER 1 MG

## 2017-02-08 PROCEDURE — 25010000002 ONDANSETRON PER 1 MG: Performed by: OBSTETRICS & GYNECOLOGY

## 2017-02-08 PROCEDURE — C1755 CATHETER, INTRASPINAL: HCPCS | Performed by: NURSE ANESTHETIST, CERTIFIED REGISTERED

## 2017-02-08 PROCEDURE — 25010000002 ROPIVACAINE PER 1 MG: Performed by: NURSE ANESTHETIST, CERTIFIED REGISTERED

## 2017-02-08 PROCEDURE — 59025 FETAL NON-STRESS TEST: CPT

## 2017-02-08 RX ORDER — BUTORPHANOL TARTRATE 1 MG/ML
1 INJECTION, SOLUTION INTRAMUSCULAR; INTRAVENOUS 2 TIMES DAILY PRN
Status: DISCONTINUED | OUTPATIENT
Start: 2017-02-08 | End: 2017-02-08

## 2017-02-08 RX ORDER — DOCUSATE SODIUM 100 MG/1
100 CAPSULE, LIQUID FILLED ORAL DAILY
Status: DISCONTINUED | OUTPATIENT
Start: 2017-02-09 | End: 2017-02-11 | Stop reason: HOSPADM

## 2017-02-08 RX ORDER — LIDOCAINE HYDROCHLORIDE AND EPINEPHRINE 5; 5 MG/ML; UG/ML
INJECTION, SOLUTION INFILTRATION; PERINEURAL AS NEEDED
Status: DISCONTINUED | OUTPATIENT
Start: 2017-02-08 | End: 2017-02-10 | Stop reason: SURG

## 2017-02-08 RX ORDER — ONDANSETRON 2 MG/ML
4 INJECTION INTRAMUSCULAR; INTRAVENOUS ONCE AS NEEDED
Status: DISCONTINUED | OUTPATIENT
Start: 2017-02-08 | End: 2017-02-08 | Stop reason: HOSPADM

## 2017-02-08 RX ORDER — BISACODYL 10 MG
10 SUPPOSITORY, RECTAL RECTAL DAILY PRN
Status: DISCONTINUED | OUTPATIENT
Start: 2017-02-09 | End: 2017-02-11 | Stop reason: HOSPADM

## 2017-02-08 RX ORDER — ROPIVACAINE HYDROCHLORIDE 2 MG/ML
INJECTION, SOLUTION EPIDURAL; INFILTRATION; PERINEURAL
Status: COMPLETED
Start: 2017-02-08 | End: 2017-02-08

## 2017-02-08 RX ORDER — FAMOTIDINE 10 MG/ML
20 INJECTION, SOLUTION INTRAVENOUS ONCE AS NEEDED
Status: DISCONTINUED | OUTPATIENT
Start: 2017-02-08 | End: 2017-02-08 | Stop reason: HOSPADM

## 2017-02-08 RX ORDER — IBUPROFEN 800 MG/1
800 TABLET ORAL 3 TIMES DAILY
Status: DISCONTINUED | OUTPATIENT
Start: 2017-02-08 | End: 2017-02-11 | Stop reason: HOSPADM

## 2017-02-08 RX ORDER — LANOLIN 100 %
OINTMENT (GRAM) TOPICAL
Status: DISCONTINUED | OUTPATIENT
Start: 2017-02-08 | End: 2017-02-11 | Stop reason: HOSPADM

## 2017-02-08 RX ORDER — SODIUM CHLORIDE 0.9 % (FLUSH) 0.9 %
1-10 SYRINGE (ML) INJECTION AS NEEDED
Status: DISCONTINUED | OUTPATIENT
Start: 2017-02-08 | End: 2017-02-11 | Stop reason: HOSPADM

## 2017-02-08 RX ORDER — ONDANSETRON 4 MG/1
4 TABLET, FILM COATED ORAL EVERY 6 HOURS PRN
Status: DISCONTINUED | OUTPATIENT
Start: 2017-02-08 | End: 2017-02-11 | Stop reason: HOSPADM

## 2017-02-08 RX ORDER — ONDANSETRON 2 MG/ML
4 INJECTION INTRAMUSCULAR; INTRAVENOUS EVERY 6 HOURS PRN
Status: DISCONTINUED | OUTPATIENT
Start: 2017-02-08 | End: 2017-02-11 | Stop reason: HOSPADM

## 2017-02-08 RX ORDER — ROPIVACAINE HYDROCHLORIDE 2 MG/ML
14 INJECTION, SOLUTION EPIDURAL; INFILTRATION; PERINEURAL CONTINUOUS
Status: DISCONTINUED | OUTPATIENT
Start: 2017-02-08 | End: 2017-02-08

## 2017-02-08 RX ORDER — ZOLPIDEM TARTRATE 5 MG/1
5 TABLET ORAL NIGHTLY PRN
Status: DISCONTINUED | OUTPATIENT
Start: 2017-02-08 | End: 2017-02-11 | Stop reason: HOSPADM

## 2017-02-08 RX ORDER — FENTANYL CITRATE 50 UG/ML
INJECTION, SOLUTION INTRAMUSCULAR; INTRAVENOUS
Status: DISCONTINUED
Start: 2017-02-08 | End: 2017-02-11 | Stop reason: HOSPADM

## 2017-02-08 RX ORDER — FENTANYL CITRATE 50 UG/ML
100 INJECTION, SOLUTION INTRAMUSCULAR; INTRAVENOUS ONCE
Status: COMPLETED | OUTPATIENT
Start: 2017-02-08 | End: 2017-02-08

## 2017-02-08 RX ORDER — ONDANSETRON 4 MG/1
4 TABLET, ORALLY DISINTEGRATING ORAL EVERY 6 HOURS PRN
Status: DISCONTINUED | OUTPATIENT
Start: 2017-02-08 | End: 2017-02-11 | Stop reason: HOSPADM

## 2017-02-08 RX ORDER — ACETAMINOPHEN 325 MG/1
650 TABLET ORAL EVERY 4 HOURS PRN
Status: DISCONTINUED | OUTPATIENT
Start: 2017-02-08 | End: 2017-02-11 | Stop reason: HOSPADM

## 2017-02-08 RX ORDER — METOCLOPRAMIDE 10 MG/1
10 TABLET ORAL ONCE
Status: DISCONTINUED | OUTPATIENT
Start: 2017-02-08 | End: 2017-02-11 | Stop reason: HOSPADM

## 2017-02-08 RX ORDER — LIDOCAINE HYDROCHLORIDE 10 MG/ML
INJECTION, SOLUTION INFILTRATION; PERINEURAL AS NEEDED
Status: DISCONTINUED | OUTPATIENT
Start: 2017-02-08 | End: 2017-02-10 | Stop reason: SURG

## 2017-02-08 RX ADMIN — IBUPROFEN 800 MG: 800 TABLET ORAL at 19:01

## 2017-02-08 RX ADMIN — ROPIVACAINE HYDROCHLORIDE 10 ML: 2 INJECTION, SOLUTION EPIDURAL; INFILTRATION at 12:03

## 2017-02-08 RX ADMIN — MISOPROSTOL 25 MCG: 100 TABLET ORAL at 06:07

## 2017-02-08 RX ADMIN — SODIUM CHLORIDE, POTASSIUM CHLORIDE, SODIUM LACTATE AND CALCIUM CHLORIDE 999 ML/HR: 600; 310; 30; 20 INJECTION, SOLUTION INTRAVENOUS at 11:12

## 2017-02-08 RX ADMIN — BENZOCAINE 1 APPLICATION: 5.6 OINTMENT TOPICAL at 21:52

## 2017-02-08 RX ADMIN — LIDOCAINE HYDROCHLORIDE AND EPINEPHRINE 3 ML: 5; 5 INJECTION, SOLUTION INFILTRATION; PERINEURAL at 12:00

## 2017-02-08 RX ADMIN — SODIUM CHLORIDE, POTASSIUM CHLORIDE, SODIUM LACTATE AND CALCIUM CHLORIDE 125 ML/HR: 600; 310; 30; 20 INJECTION, SOLUTION INTRAVENOUS at 12:08

## 2017-02-08 RX ADMIN — MISOPROSTOL 25 MCG: 100 TABLET ORAL at 03:06

## 2017-02-08 RX ADMIN — SODIUM CHLORIDE, POTASSIUM CHLORIDE, SODIUM LACTATE AND CALCIUM CHLORIDE 999 ML/HR: 600; 310; 30; 20 INJECTION, SOLUTION INTRAVENOUS at 16:46

## 2017-02-08 RX ADMIN — WITCH HAZEL 1 PAD: 500 SOLUTION RECTAL; TOPICAL at 21:52

## 2017-02-08 RX ADMIN — Medication: at 21:52

## 2017-02-08 RX ADMIN — LIDOCAINE HYDROCHLORIDE 3 ML: 10 INJECTION, SOLUTION INFILTRATION; PERINEURAL at 11:56

## 2017-02-08 RX ADMIN — EPHEDRINE SULFATE 5 MG: 50 INJECTION INTRAMUSCULAR; INTRAVENOUS; SUBCUTANEOUS at 14:52

## 2017-02-08 RX ADMIN — ROPIVACAINE HYDROCHLORIDE 14 ML/HR: 2 INJECTION, SOLUTION EPIDURAL; INFILTRATION at 12:05

## 2017-02-08 RX ADMIN — FENTANYL CITRATE 100 MCG: 50 INJECTION, SOLUTION INTRAMUSCULAR; INTRAVENOUS at 12:03

## 2017-02-08 RX ADMIN — ACETAMINOPHEN 650 MG: 325 TABLET ORAL at 16:45

## 2017-02-08 RX ADMIN — ONDANSETRON 4 MG: 2 INJECTION, SOLUTION INTRAMUSCULAR; INTRAVENOUS at 11:33

## 2017-02-08 RX ADMIN — HYDROCORTISONE 2.5% 1 APPLICATION: 25 CREAM TOPICAL at 21:52

## 2017-02-08 RX ADMIN — BENZOCAINE AND MENTHOL: 20; .5 SPRAY TOPICAL at 21:52

## 2017-02-08 RX ADMIN — OXYTOCIN 8 MILLI-UNITS/MIN: 10 INJECTION INTRAVENOUS at 15:15

## 2017-02-08 RX ADMIN — ACETAMINOPHEN 650 MG: 325 TABLET ORAL at 07:35

## 2017-02-08 RX ADMIN — MISOPROSTOL 600 MCG: 100 TABLET ORAL at 18:40

## 2017-02-08 RX ADMIN — SODIUM CHLORIDE, POTASSIUM CHLORIDE, SODIUM LACTATE AND CALCIUM CHLORIDE 999 ML/HR: 600; 310; 30; 20 INJECTION, SOLUTION INTRAVENOUS at 09:17

## 2017-02-08 RX ADMIN — SODIUM CHLORIDE, POTASSIUM CHLORIDE, SODIUM LACTATE AND CALCIUM CHLORIDE 125 ML/HR: 600; 310; 30; 20 INJECTION, SOLUTION INTRAVENOUS at 02:26

## 2017-02-08 NOTE — NON STRESS TEST
Rachel Escobar, a  at 40w6d with an CONCEPCION of 2017, by Patient Reported, was seen at [facility] LABOR DELIVERY for a nonstress test.    Chief Complaint   Patient presents with   • Scheduled Induction       Interpretation A  Nonstress Test Interpretation A: Reactive (17 0530 : Shannan Cabello, RN)        Verified Reactive by ANASTASIIA Menjivar RN on

## 2017-02-08 NOTE — ANESTHESIA PREPROCEDURE EVALUATION
Anesthesia Evaluation     Patient summary reviewed   no history of anesthetic complications:  NPO Status: > 8 hours   Airway   Mallampati: II  TM distance: >3 FB  Neck ROM: full  no difficulty expected  Dental      Pulmonary - negative pulmonary ROS and normal exam   Cardiovascular - negative cardio ROS and normal exam        Neuro/Psych- negative ROS  GI/Hepatic/Renal/Endo - negative ROS     Musculoskeletal (-) negative ROS    Abdominal  - normal exam   Substance History - negative use     OB/GYN    (+) Pregnant,         Other - negative ROS                                   Anesthesia Plan    ASA 2     epidural     Anesthetic plan and risks discussed with patient and spouse/significant other.  Use of blood products discussed with patient and spouse/significant other .

## 2017-02-08 NOTE — PAYOR COMM NOTE
"CONTACT:  DELTA WALSH RN, BSN  UTILIZATION MANAGEMENT DEPT.  [facility]  [address]  [facility], [address]  PHONE:  [phone]  FAX: [phone]    INPATIENT ADMISSION NOTIFICATION. PT ADMITTED ON 2/7/17 FOR INDUCTION OF LABOR, STILL WAITING FOR PT TO DELIVER, WILL THEN FAX IN DELIVERY INFORMATION ONCE IT IS AVAILABLE.    ICD CODE: Z3A.40, Z33.1  [facility] NPI: [id number]  DR. RENETTA TAPIA NPI: [id number]    Alriaz Glen (26 y.o. Female)     Date of Birth Social Security Number Address Home Phone MRN    1990  [address]   [facility] [id number] [phone] [id number]    Buddhist Marital Status          [facility] Single       Admission Date Admission Type Admitting Provider Attending Provider Department, Room/Bed    2/7/17 Elective Renetta Tapia III, MD Ascani, Enrico III, MD [facility] LABOR DELIVERY, L230/1    Discharge Date Discharge Disposition Discharge Destination                      Attending Provider: Renetta Tapia III, MD     Allergies:  No Known Allergies    Isolation:  None   Infection:  None   Code Status:  FULL    Ht:  61\" (154.9 cm)   Wt:  194 lb (88 kg)    Admission Cmt:  None   Principal Problem:  None                Active Insurance as of 2/7/2017     Primary Coverage     Payor Plan Insurance Group Employer/Plan Group    ANTHEM MEDICAID ANTHEM MEDICAID [id number]     Payor Plan Address Payor Plan Phone Number Effective From Effective To    [address] [address] [phone] 6/1/2016     [place] [address]       Subscriber Name Subscriber Birth Date Member ID       GLEN CALDERÓN 1990 [id number]                 Emergency Contacts      (Rel.) Home Phone Work Phone Mobile Phone    AlBrittany mello (Spouse) [phone] -- --    Lashawn Clark (Mother) [phone] -- --               History & Physical      H&P filed by Ava Bass MD at 2/8/2017  7:42 AM      Scan on 2/7/2017 : [employer] 02/07/2017 (below)              " Electronically signed by Interface, Scans Incoming at 2/8/2017  7:42 AM      H&P filed by Ava Bass MD at 2/7/2017  7:22 AM      Scan on 2/7/2017 : [employer] 02/07/2017 (below)              Electronically signed by Interface, Scans Incoming at 2/7/2017  7:22 AM      Eddie Garcia III, MD at 2/7/2017  9:23 AM                Chief complaint Induction of Labor.     History of Present Illness: Patient is a 26 y.o. female who presents with IUP at 40w5d weeks gestation. G 2, P 0, A1.       PAST MEDICAL HISTORY   Rubella Non Immune    Blood Type: A +  Fetal Gender: Female  GBS: Negative    Past Surgical History   Procedure Laterality Date   • Tonsillectomy       Family History   Problem Relation Age of Onset   • Diabetes Father    • Hypertension Father      Social History   Substance Use Topics   • Smoking status: Never Smoker   • Smokeless tobacco: Never Used   • Alcohol use No     No prescriptions prior to admission.     Allergies:  Review of patient's allergies indicates no known allergies.      Vital Signs  Temp:  [97.5 °F (36.4 °C)] 97.5 °F (36.4 °C)  Heart Rate:  [109-136] 126  Resp:  [22] 22  BP: (116-144)/(68-90) 134/71Height 61 in  Weight 197 lbs  /75  BMI 37  Pulse 130  Rep 16  Temp 97.4  Pulse Ox 98    Radiology  Imaging Results (last 24 hours)     ** No results found for the last 24 hours. **          Labs  Lab Results (last 24 hours)     Procedure Component Value Units Date/Time    Group B Strep Culture [[id number]] Resulted:  01/17/17     Specimen:  Swab Updated:  02/07/17 0613     External Strep Group B Ag Negative     CBC (No Diff) [[id number]]  (Abnormal) Collected:  02/07/17 0640    Specimen:  Blood Updated:  02/07/17 0703     WBC 10.05 10*3/mm3      RBC 4.17 (L) 10*6/mm3      Hemoglobin 10.8 (L) g/dL      Hematocrit 33.5 (L) %      MCV 80.3 fL      MCH 25.9 (L) pg      MCHC 32.2 (L) g/dL      RDW 14.8 (H) %      RDW-SD 43.4 fl      MPV 10.8 (H) fL      Platelets 195 10*3/mm3              Review of Systems    The following systems were reviewed and negative;  ENT, respiratory, cardiovascular, gastrointestinal, genitourinary, breast, endocrine and allergies / immunologic.      Physical Exam:      General Appearance:    Alert, cooperative, in no acute distress   Head:    Normocephalic, without obvious abnormality, atraumatic   Eyes:            Lids and lashes normal, conjunctivae and sclerae normal, no   icterus, no pallor, corneas clear, PERRLA   Ears:    Ears appear intact with no abnormalities noted   Throat:   No oral lesions, no thrush, oral mucosa moist   Neck:   No adenopathy, supple, trachea midline, no thyromegaly, no     carotid bruit, no JVD   Back:     No kyphosis present, no scoliosis present, no skin lesions,       erythema or scars, no tenderness to percussion or                   palpation,   range of motion normal   Lungs:     Clear to auscultation,respirations regular, even and                   unlabored    Heart:    Regular rhythm and normal rate, normal S1 and S2, no            murmur, no gallop, no rub, no click   Breast Exam:    Deferred   Abdomen:     Normal bowel sounds, no masses, no organomegaly, soft        non-tender, non-distended, no guarding, no rebound                 tenderness   Genitalia:    Deferred   Extremities:   Moves all extremities well, no edema, no cyanosis, no              redness   Pulses:   Pulses palpable and equal bilaterally   Skin:   No bleeding, bruising or rash   Lymph nodes:   No palpable adenopathy   Neurologic:   Cranial nerves 2 - 12 grossly intact, sensation intact, DTR        present and equal bilaterally         Assessment: Patient is a 26 y.o. female who presents with IUP at 40w5d weeks gestation. G 2, P 0, A1. Patient wants to proceed with an IOL. Patient understands that her cervix may not be favorable at this time. Patient understands that an IOL may resutl in a  section.     Plan of Care: Admit. Proceed with an induction  of labor.     Eddie Garcia III, MD  02/07/17  9:23 AM       Electronically signed by Eddie Garcia III, MD at 2/7/2017  9:24 AM        Physician Progress Notes (last 24 hours) (Notes from 2/7/2017  1:24 PM through 2/8/2017  1:24 PM)     No notes of this type exist for this encounter.

## 2017-02-08 NOTE — ANESTHESIA PROCEDURE NOTES
Labor Epidural    Patient location during procedure: OB  Start Time: 2/8/2017 11:55 AM  Stop Time: 2/8/2017 12:00 PM  Indication:at surgeon's request  Performed By  CRNA: LOVELY CLINE  Preanesthetic Checklist  Completed: patient identified, site marked, surgical consent, pre-op evaluation, timeout performed, IV checked, risks and benefits discussed and monitors and equipment checked  Epidural Block Prep:  Pt Position:sitting  Sterile Tech:cap, gloves, mask and sterile barrier  Monitoring:blood pressure monitoring and continuous pulse oximetry  Epidural Block Procedure:  Approach:midline  Guidance:landmark technique  Location:L3-L4  Needle Type:Tuohy  Needle Gauge:18 G  Loss of Resistance: 6cm  Cath Depth at skin:12 cm  Aspiration:negative  Test Dose:negative  Post Assessment:  Dressing:secured with tape and occlusive dressing applied  Pt Tolerance:patient tolerated the procedure well with no apparent complications  Complications:no

## 2017-02-09 LAB
BASOPHILS # BLD AUTO: 0.02 10*3/MM3 (ref 0–0.3)
BASOPHILS NFR BLD AUTO: 0.2 % (ref 0–2)
DEPRECATED RDW RBC AUTO: 44.2 FL (ref 37–54)
EOSINOPHIL # BLD AUTO: 0.1 10*3/MM3 (ref 0–0.7)
EOSINOPHIL NFR BLD AUTO: 0.8 % (ref 0–5)
ERYTHROCYTE [DISTWIDTH] IN BLOOD BY AUTOMATED COUNT: 15.2 % (ref 11.5–14.5)
HCT VFR BLD AUTO: 30.7 % (ref 37–47)
HGB BLD-MCNC: 9.6 G/DL (ref 12–16)
IMM GRANULOCYTES # BLD: 0.05 10*3/MM3 (ref 0–0.03)
IMM GRANULOCYTES NFR BLD: 0.4 % (ref 0–0.5)
LYMPHOCYTES # BLD AUTO: 1.76 10*3/MM3 (ref 1–3)
LYMPHOCYTES NFR BLD AUTO: 14.5 % (ref 21–51)
MCH RBC QN AUTO: 25.2 PG (ref 27–33)
MCHC RBC AUTO-ENTMCNC: 31.3 G/DL (ref 33–37)
MCV RBC AUTO: 80.6 FL (ref 80–94)
MONOCYTES # BLD AUTO: 1.48 10*3/MM3 (ref 0.1–0.9)
MONOCYTES NFR BLD AUTO: 12.2 % (ref 0–10)
NEUTROPHILS # BLD AUTO: 8.75 10*3/MM3 (ref 1.4–6.5)
NEUTROPHILS NFR BLD AUTO: 71.9 % (ref 30–70)
PLATELET # BLD AUTO: 176 10*3/MM3 (ref 130–400)
PMV BLD AUTO: 10.8 FL (ref 6–10)
RBC # BLD AUTO: 3.81 10*6/MM3 (ref 4.2–5.4)
WBC NRBC COR # BLD: 12.16 10*3/MM3 (ref 4.5–12.5)

## 2017-02-09 PROCEDURE — 85025 COMPLETE CBC W/AUTO DIFF WBC: CPT | Performed by: OBSTETRICS & GYNECOLOGY

## 2017-02-09 RX ORDER — HYDROCODONE BITARTRATE AND ACETAMINOPHEN 5; 325 MG/1; MG/1
1 TABLET ORAL EVERY 4 HOURS PRN
Status: DISCONTINUED | OUTPATIENT
Start: 2017-02-09 | End: 2017-02-11 | Stop reason: HOSPADM

## 2017-02-09 RX ADMIN — BENZOCAINE 1 APPLICATION: 5.6 OINTMENT TOPICAL at 12:42

## 2017-02-09 RX ADMIN — PRAMOXINE HYDROCHLORIDE AND HYDROCORTISONE ACETATE: 100; 100 AEROSOL, FOAM TOPICAL at 12:42

## 2017-02-09 RX ADMIN — HYDROCORTISONE 2.5% 1 APPLICATION: 25 CREAM TOPICAL at 12:42

## 2017-02-09 RX ADMIN — HYDROCODONE BITARTRATE AND ACETAMINOPHEN 1 TABLET: 5; 325 TABLET ORAL at 17:56

## 2017-02-09 RX ADMIN — DOCUSATE SODIUM 100 MG: 100 CAPSULE, LIQUID FILLED ORAL at 08:13

## 2017-02-09 RX ADMIN — HYDROCODONE BITARTRATE AND ACETAMINOPHEN 1 TABLET: 5; 325 TABLET ORAL at 11:02

## 2017-02-09 RX ADMIN — IBUPROFEN 800 MG: 800 TABLET ORAL at 21:21

## 2017-02-09 RX ADMIN — IBUPROFEN 800 MG: 800 TABLET ORAL at 13:32

## 2017-02-09 RX ADMIN — HYDROCODONE BITARTRATE AND ACETAMINOPHEN 1 TABLET: 5; 325 TABLET ORAL at 00:51

## 2017-02-09 RX ADMIN — HYDROCODONE BITARTRATE AND ACETAMINOPHEN 1 TABLET: 5; 325 TABLET ORAL at 05:05

## 2017-02-09 RX ADMIN — WITCH HAZEL 1 PAD: 500 SOLUTION RECTAL; TOPICAL at 12:42

## 2017-02-09 RX ADMIN — BENZOCAINE AND MENTHOL: 20; .5 SPRAY TOPICAL at 12:42

## 2017-02-09 RX ADMIN — IBUPROFEN 800 MG: 800 TABLET ORAL at 05:04

## 2017-02-09 NOTE — PROGRESS NOTES
ASHLEY Moreno  Vaginal Delivery Progress Note    Subjective   Subjective  Postpartum Day 1: Vaginal Delivery    The patient feels well.  Her pain is well controlled with nonsteroidal anti-inflammatory drugs.   She is ambulating well.  Patient describes her bleeding as moderate lochia.    Breastfeeding: infant latching.    Objective     Objective   Vital Signs Range for the last 24 hours  Temperature: Temp:  [96.6 °F (35.9 °C)-99 °F (37.2 °C)] 97.4 °F (36.3 °C)   Temp Source: Temp src: Oral   BP: BP: ()/() 116/67   Pulse: Heart Rate:  [] 81   Respirations: Resp:  [16-22] 16   Weight:       Admit Height:       Physical Exam:  General:  no acute distresss.  Abdomen: Fundus: appropriate, firm, non tender  Extremities: normal, atraumatic, no cyanosis, and trace edema.     [unfilled]       Lab Results   Component Value Date    ABO A 02/07/2017    RH Positive 02/07/2017        Lab Results   Component Value Date    HGB 9.6 (L) 02/09/2017    HCT 30.7 (L) 02/09/2017         Assessment/Plan   Active Problems:    Pregnancy    39 weeks gestation of pregnancy      Rachel Escobar is Day 1  post-partum  Vaginal, Vacuum (Extractor)  repaired with 2.0 chromic .      Plan:  Continue current care.      Saniya Be,   2/9/2017  9:12 AM

## 2017-02-09 NOTE — PLAN OF CARE
Problem: Patient Care Overview (Adult)  Goal: Plan of Care Review    02/09/17 1736   Coping/Psychosocial Response Interventions   Plan Of Care Reviewed With patient   Patient Care Overview   Progress progress toward functional goals as expected   Outcome Evaluation   Outcome Summary/Follow up Plan patient is doing well, firm, small locia, prn meds for pain.        Goal: Adult Individualization and Mutuality  Outcome: Ongoing (interventions implemented as appropriate)  Goal: Discharge Needs Assessment  Outcome: Ongoing (interventions implemented as appropriate)    Problem: Postpartum, Vaginal Delivery (Adult)  Goal: Signs and Symptoms of Listed Potential Problems Will be Absent or Manageable (Postpartum, Vaginal Delivery)  Outcome: Ongoing (interventions implemented as appropriate)

## 2017-02-09 NOTE — PLAN OF CARE
Problem: Patient Care Overview (Adult)  Goal: Plan of Care Review  Outcome: Ongoing (interventions implemented as appropriate)    02/09/17 0127   Coping/Psychosocial Response Interventions   Plan Of Care Reviewed With patient   Patient Care Overview   Progress no change       Goal: Adult Individualization and Mutuality  Outcome: Ongoing (interventions implemented as appropriate)  Goal: Discharge Needs Assessment  Outcome: Ongoing (interventions implemented as appropriate)    Problem: Postpartum, Vaginal Delivery (Adult)  Goal: Signs and Symptoms of Listed Potential Problems Will be Absent or Manageable (Postpartum, Vaginal Delivery)  Outcome: Ongoing (interventions implemented as appropriate)

## 2017-02-10 PROCEDURE — 3E033VJ INTRODUCTION OF OTHER HORMONE INTO PERIPHERAL VEIN, PERCUTANEOUS APPROACH: ICD-10-PCS | Performed by: OBSTETRICS & GYNECOLOGY

## 2017-02-10 PROCEDURE — 0HQ9XZZ REPAIR PERINEUM SKIN, EXTERNAL APPROACH: ICD-10-PCS | Performed by: OBSTETRICS & GYNECOLOGY

## 2017-02-10 RX ORDER — HYDROCODONE BITARTRATE AND ACETAMINOPHEN 10; 325 MG/1; MG/1
1 TABLET ORAL EVERY 6 HOURS PRN
Status: DISCONTINUED | OUTPATIENT
Start: 2017-02-10 | End: 2017-02-10

## 2017-02-10 RX ORDER — HYDROCODONE BITARTRATE AND ACETAMINOPHEN 10; 325 MG/1; MG/1
1 TABLET ORAL EVERY 4 HOURS PRN
Status: DISCONTINUED | OUTPATIENT
Start: 2017-02-10 | End: 2017-02-11 | Stop reason: HOSPADM

## 2017-02-10 RX ADMIN — HYDROCODONE BITARTRATE AND ACETAMINOPHEN 1 TABLET: 10; 325 TABLET ORAL at 09:45

## 2017-02-10 RX ADMIN — HYDROCODONE BITARTRATE AND ACETAMINOPHEN 1 TABLET: 5; 325 TABLET ORAL at 08:06

## 2017-02-10 RX ADMIN — IBUPROFEN 800 MG: 800 TABLET ORAL at 22:37

## 2017-02-10 RX ADMIN — BENZOCAINE 1 APPLICATION: 5.6 OINTMENT TOPICAL at 22:42

## 2017-02-10 RX ADMIN — HYDROCODONE BITARTRATE AND ACETAMINOPHEN 1 TABLET: 5; 325 TABLET ORAL at 18:40

## 2017-02-10 RX ADMIN — WITCH HAZEL 1 PAD: 500 SOLUTION RECTAL; TOPICAL at 22:42

## 2017-02-10 RX ADMIN — DOCUSATE SODIUM 100 MG: 100 CAPSULE, LIQUID FILLED ORAL at 08:06

## 2017-02-10 RX ADMIN — PRAMOXINE HYDROCHLORIDE AND HYDROCORTISONE ACETATE: 100; 100 AEROSOL, FOAM TOPICAL at 18:44

## 2017-02-10 RX ADMIN — IBUPROFEN 800 MG: 800 TABLET ORAL at 05:15

## 2017-02-10 RX ADMIN — PRAMOXINE HYDROCHLORIDE AND HYDROCORTISONE ACETATE: 100; 100 AEROSOL, FOAM TOPICAL at 22:42

## 2017-02-10 RX ADMIN — BENZOCAINE AND MENTHOL: 20; .5 SPRAY TOPICAL at 22:42

## 2017-02-10 RX ADMIN — WITCH HAZEL 1 PAD: 500 SOLUTION RECTAL; TOPICAL at 18:44

## 2017-02-10 RX ADMIN — PRAMOXINE HYDROCHLORIDE AND HYDROCORTISONE ACETATE: 100; 100 AEROSOL, FOAM TOPICAL at 08:06

## 2017-02-10 RX ADMIN — HYDROCODONE BITARTRATE AND ACETAMINOPHEN 1 TABLET: 10; 325 TABLET ORAL at 13:48

## 2017-02-10 RX ADMIN — IBUPROFEN 800 MG: 800 TABLET ORAL at 13:47

## 2017-02-10 RX ADMIN — HYDROCORTISONE 2.5% 1 APPLICATION: 25 CREAM TOPICAL at 22:42

## 2017-02-10 NOTE — PROGRESS NOTES
ASHLEY Moreno  Vaginal Delivery Progress Note    Subjective   Subjective  Postpartum Day 2: Vaginal Delivery    The patient feels well.  Her pain is well controlled with nonsteroidal anti-inflammatory drugs.   She is ambulating well.  Patient describes her bleeding as moderate lochia.    Breastfeeding: infant latching. Mother concerned bc baby with elevated bilirubin and thought it was because she was not feeding correctly.  Reassured today.  Educated to feed baby every 2-3 hours will consult lactation consultant and advise nursery of mother concerns.      Objective     Objective   Vital Signs Range for the last 24 hours  Temperature: Temp:  [97.7 °F (36.5 °C)-98.1 °F (36.7 °C)] 97.7 °F (36.5 °C)   Temp Source: Temp src: Oral   BP: BP: (120-127)/(64-67) 120/67   Pulse: Heart Rate:  [] 97   Respirations: Resp:  [16] 16   Weight:       Admit Height:       Physical Exam:  General:  no acute distresss.  Abdomen: Fundus: appropriate, firm, non tender  Extremities: normal, atraumatic, no cyanosis, and trace edema.       [unfilled]       Lab Results   Component Value Date    ABO A 02/07/2017    RH Positive 02/07/2017        Lab Results   Component Value Date    HGB 9.6 (L) 02/09/2017    HCT 30.7 (L) 02/09/2017         Assessment/Plan   Assessment & Plan  Active Problems:    Pregnancy    39 weeks gestation of pregnancy      Rachel Escobar is Day 2  post-partum  Vaginal, Vacuum (Extractor)  repaired with 2.0 chromic .      Plan:  Continue current care baby not going home today under phototherapy, breastfeeding counseling today      CORTNEY Florence  2/10/2017  8:59 AM

## 2017-02-10 NOTE — OP NOTE
Josh  Vaginal Delivery Note    Delivery     Delivery: Vaginal, Vacuum (Extractor)     YOB: 2015    Time of Birth: 6:15 PM      Anesthesia: Epidural     Delivering clinician: Marlon Marquez    Forceps?   No   Vacuum? Yes  Vacuum Delivery  Vacuum attempted? No     Vacuum indication:      Vacuum type: Kiwi Pro (bell)    Application location:      First Attempt     Time applied:  6:13 PM    Time removed:  6:15 PM    Second Attempt    Time applied:      Time removed:      Third Attempt    Time applied:      Time removed:      Number of pulls: 3    Number of pop-offs: 0    Low-end pressure range:      High-end pressure range:      Total application time:      Applied by: DR. MARQUEZ    Failed? No        Shoulder dystocia present: No        Delivery narrative:      Infant    Findings: female  infant     Infant observations: Weight: 6 lb 12.8 oz (3.084 kg)   Length: 18.701  in  Observations/Comments:         Apgars: 8   @ 1 minute /    9   @ 5 minutes   Infant Name:      Placenta, Cord, and Fluid    Placenta delivered  Spontaneous  at  2/8  6:22 PM     Cord: 3 vessels  present.   Nuchal Cord?  no   Cord blood obtained: Yes                   Repair    Episiotomy: None    Lacerations: Yes  Laceration Information  Laceration Repaired?   Perineal: None       Periurethral:         Labial:         Sulcus: bilateral  Yes    Vaginal: Yes       Cervical: No           Estimated Blood Loss: 300  mls.   Suture used for repair: 2-0 chromic     Complications  none    Disposition  Mother to postpartum in stable condition.    Marlon Marquez DO  02/10/17  8:54 AM

## 2017-02-10 NOTE — PLAN OF CARE
Problem: Patient Care Overview (Adult)  Goal: Plan of Care Review  Outcome: Ongoing (interventions implemented as appropriate)    02/09/17 1736 02/09/17 2000   Coping/Psychosocial Response Interventions   Plan Of Care Reviewed With --  patient;significant other   Patient Care Overview   Progress progress toward functional goals as expected --    Outcome Evaluation   Outcome Summary/Follow up Plan patient is doing well, firm, small locia, prn meds for pain.  --        Goal: Discharge Needs Assessment  Outcome: Ongoing (interventions implemented as appropriate)    Problem: Postpartum, Vaginal Delivery (Adult)  Goal: Signs and Symptoms of Listed Potential Problems Will be Absent or Manageable (Postpartum, Vaginal Delivery)  Outcome: Ongoing (interventions implemented as appropriate)

## 2017-02-11 VITALS
TEMPERATURE: 97.9 F | DIASTOLIC BLOOD PRESSURE: 76 MMHG | RESPIRATION RATE: 20 BRPM | HEART RATE: 106 BPM | SYSTOLIC BLOOD PRESSURE: 130 MMHG

## 2017-02-11 RX ORDER — IBUPROFEN 600 MG/1
600 TABLET ORAL EVERY 6 HOURS PRN
Qty: 30 TABLET | Refills: 0 | Status: SHIPPED | OUTPATIENT
Start: 2017-02-11 | End: 2017-03-13

## 2017-02-11 RX ADMIN — IBUPROFEN 800 MG: 800 TABLET ORAL at 13:29

## 2017-02-11 RX ADMIN — DOCUSATE SODIUM 100 MG: 100 CAPSULE, LIQUID FILLED ORAL at 09:09

## 2017-02-11 RX ADMIN — HYDROCODONE BITARTRATE AND ACETAMINOPHEN 1 TABLET: 10; 325 TABLET ORAL at 09:10

## 2017-02-11 RX ADMIN — BENZOCAINE 1 APPLICATION: 5.6 OINTMENT TOPICAL at 13:29

## 2017-02-11 RX ADMIN — WITCH HAZEL 1 PAD: 500 SOLUTION RECTAL; TOPICAL at 13:29

## 2017-02-11 RX ADMIN — HYDROCODONE BITARTRATE AND ACETAMINOPHEN 1 TABLET: 10; 325 TABLET ORAL at 13:29

## 2017-02-11 RX ADMIN — HYDROCORTISONE 2.5% 1 APPLICATION: 25 CREAM TOPICAL at 13:29

## 2017-02-11 RX ADMIN — IBUPROFEN 800 MG: 800 TABLET ORAL at 06:04

## 2017-02-11 NOTE — DISCHARGE SUMMARY
Josh  Delivery Discharge Summary    Primary OB Clinician: Marlon Marquez DO    EDC: Estimated Date of Delivery: 17    Gestational Age:40w6d    Antepartum complications: none    Date of Delivery: 2017   Time of Delivery: 6:15 PM     Delivered By:  Marlon Marquez     Delivery Type: Vaginal, Vacuum (Extractor)      Tubal Ligation: n/a    Baby:@BABYNOHDR(SEX)@ infant;   Apgar:  8   @ 1 minute /   Apgar:  9   @ 5 minutes   Weight: @BABYNOHDR(BIRTHWEIGHT)@   Length: @BABYNOHDR(DELRECITEM,HSB,54867,,,,)@    Anesthesia: Epidural      Intrapartum complications: None    Laceration: Yes  Laceration Information  Laceration Repaired?   Perineal: None       Periurethral:         Labial:         Sulcus: bilateral  Yes    Vaginal: Yes       Cervical: No            Episiotomy: No    Placenta: Spontaneous     Feeding method: Breastfeeding Status: Yes    Discharge Date: 2017; Discharge Time: 12:13 PM    Plan:    Follow-up appointment with primary OB/GYN in 2 weeks.

## 2017-02-11 NOTE — PLAN OF CARE
Problem: Patient Care Overview (Adult)  Goal: Plan of Care Review  Outcome: Ongoing (interventions implemented as appropriate)    02/10/17 8284   Coping/Psychosocial Response Interventions   Plan Of Care Reviewed With patient   Patient Care Overview   Progress improving   Outcome Evaluation   Outcome Summary/Follow up Plan pt will dc home stable and able to care for self and infant       Goal: Adult Individualization and Mutuality  Outcome: Ongoing (interventions implemented as appropriate)  Goal: Discharge Needs Assessment  Outcome: Ongoing (interventions implemented as appropriate)    Problem: Postpartum, Vaginal Delivery (Adult)  Goal: Signs and Symptoms of Listed Potential Problems Will be Absent or Manageable (Postpartum, Vaginal Delivery)  Outcome: Ongoing (interventions implemented as appropriate)

## 2017-02-11 NOTE — PROGRESS NOTES
ASHLEY Moreno  Vaginal Delivery Progress Note    Subjective   Postpartum Day 3: Vaginal Delivery    The patient feels well.  Denies complaints.  Lochia is light.      Objective     Vital Signs Range for the last 24 hours  Temperature: Temp:  [97.9 °F (36.6 °C)-98 °F (36.7 °C)] 97.9 °F (36.6 °C)   Temp Source: Temp src: Oral   BP: BP: (120-130)/(74-76) 130/76   Pulse: Heart Rate:  [100-106] 106   Respirations: Resp:  [18-20] 20   SPO2:     O2 Amount (l/min):     O2 Devices     Weight:       Admit Height:         Physical Exam:  General:  no acute distresss.  Abdomen: Fundus: appropriate, firm, non tender  Extremities: normal, atraumatic, no cyanosis, and trace edema.       Lab results reviewed:  Yes       Assessment/Plan     Normal postpartum state      Plan:  Discharge home with standard precautions and return to clinic in 4-6 weeks.      Marlon Marquez,   2/11/2017  12:12 PM

## 2017-02-12 NOTE — PAYOR COMM NOTE
"[facility]   EMBER REYES   PHONE  [phone]  FAX  [phone]    Glen Escobar (26 y.o. Female)     Date of Birth Social Security Number Address Home Phone MRN    1990  [address]   [place] [address] [phone] [id number]    Yazidi Marital Status          [facility] Single       Admission Date Admission Type Admitting Provider Attending Provider Department, Room/Bed    2/7/17 Elective Eddie Garcia III, MD  [facility], W242/1    Discharge Date Discharge Disposition Discharge Destination        2/11/2017 Home or Self Care             Attending Provider: (none)    Allergies:  No Known Allergies    Isolation:  None   Infection:  None   Code Status:  Prior    Ht:  61\" (154.9 cm)   Wt:  194 lb (88 kg)    Admission Cmt:  None   Principal Problem:  None                Active Insurance as of 2/7/2017     Primary Coverage     Payor Plan Insurance Group Employer/Plan Group    [facility] [id number]     Coverage Address Coverage Phone Number Effective From Effective To    [address] [address] [phone] 1/17/2017     [place] [address]       Subscriber Name Subscriber Birth Date Member ID       BRITTANY ESCOBAR 9/18/1995 [id number]           Secondary Coverage     Payor Plan Insurance Group Employer/Plan Group    [employer] MEDICAID [id number]     Payor Plan Address Payor Plan Phone Number Effective From Effective To    [address] [address] [phone] 6/1/2016     [place] [address]       Subscriber Name Subscriber Birth Date Member ID       GLEN ESCOBAR 1990 [id number]                 Emergency Contacts      (Rel.) Home Phone Work Phone Mobile Phone    LuzBrittany (Spouse) [phone] -- --    Lashawn Clark (Mother) [phone] -- --              "

## 2017-02-13 NOTE — PAYOR COMM NOTE
"CONTACT:  DELTA WALSH RN, BSN  UTILIZATION MANAGEMENT DEPT.  [facility]  [address], [address]  PHONE:  [phone]  FAX: [phone]    REQUESTING APPROVAL FOR ADDITIONAL DAY. REFER TO AUTH # [id number]    Glen Escobar (26 y.o. Female)     Date of Birth Social Security Number Address Home Phone MRN    1990  PO   [facility] [id number] [phone] [id number]    [place] Marital Status          [facility] Single       Admission Date Admission Type Admitting Provider Attending Provider Department, Room/Bed    2/7/17 Elective Eddie Garcia III, MD  [facility], W242/1    Discharge Date Discharge Disposition Discharge Destination        2/11/2017 Home or Self Care             Attending Provider: (none)    Allergies:  No Known Allergies    Isolation:  None   Infection:  None   Code Status:  Prior    Ht:  61\" (154.9 cm)   Wt:  194 lb (88 kg)    Admission Cmt:  None   Principal Problem:  None                Active Insurance as of 2/7/2017     Primary Coverage     Payor Plan Insurance Group Employer/Plan Group    [facility] [id number]     Coverage Address Coverage Phone Number Effective From Effective To    [address] [address] [phone] 1/17/2017     [place] [address]       Subscriber Name Subscriber Birth Date Member ID       BRITTANY ESCOBAR [birth year]18[birth year]1995 [id number]           Secondary Coverage     Payor Plan Insurance Group Employer/Plan Group    [employer] [employer] [id number]     Payor Plan Address Payor Plan Phone Number Effective From Effective To    [address] [address] [phone] 6/1/2016     [place] [address]       Subscriber Name Subscriber Birth Date Member ID       GLEN ESCOBAR 1990 [id number]                 Emergency Contacts      (Rel.) Home Phone Work Phone Mobile Phone    LuzBrittany (Spouse) [phone] -- --    EduardoLashawn (Mother) [phone] -- --            Vital " Signs (last 72 hrs)       02/10 0700  -  02/11 0659 02/11 0700  -  02/12 0659 02/12 0700  -  02/13 0659 02/13 0700  -  02/13 0728   Most Recent    Temp (°F) 97.7 -  98      97.9         97.9 (36.6)    Heart Rate 97 -  100      106         106    Resp 16 -  18      20         20    /67 -  120/74      130/76         130/76          Intake & Output (last 3 days)       02/10 0701 - 02/11 0700 02/11 0701 - 02/12 0700 02/12 0701 - 02/13 0700 02/13 0701 - 02/14 0700    Urine        Blood        Total Output            Net                        Hospital Medications (all)       Dose Frequency Start End    FentaNYL Citrate (PF) (SUBLIMAZE) injection 100 mcg 100 mcg Once 2/8/2017 2/8/2017    Sig - Route: 2 mL by Epidural route 1 (One) Time. - Epidural    lactated ringers bolus 1,000 mL 1,000 mL Once 2/7/2017 2/7/2017    Sig - Route: Infuse 1,000 mL into a venous catheter 1 (One) Time. - Intravenous    ropivacaine (NAROPIN) 0.2 % injection  - ADS Override Pull   2/8/2017 2/8/2017    Notes to Pharmacy: Created by cabinet override    acetaminophen (TYLENOL) tablet 650 mg (Discontinued) 650 mg Every 4 Hours PRN 2/7/2017 2/8/2017    Sig - Route: Take 2 tablets by mouth Every 4 (Four) Hours As Needed for mild pain (1-3) or headaches. - Oral    Reason for Discontinue: Patient Transfer    acetaminophen (TYLENOL) tablet 650 mg (Discontinued) 650 mg Every 4 Hours PRN 2/7/2017 2/8/2017    Sig - Route: Take 2 tablets by mouth Every 4 (Four) Hours As Needed for mild pain (1-3) or headaches. - Oral    Reason for Discontinue: Patient Transfer    acetaminophen (TYLENOL) tablet 650 mg (Discontinued) 650 mg Every 4 Hours PRN 2/8/2017 2/11/2017    Sig - Route: Take 2 tablets by mouth Every 4 (Four) Hours As Needed for mild pain (1-3). - Oral    Reason for Discontinue: Patient Discharge    ampicillin 1 g/100 mL 0.9% NS (MBP) (Discontinued) 1 g Every 4 Hours 2/8/2017 2/8/2017    Sig - Route: Infuse 100 mL into a venous catheter Every 4  "(Four) Hours. - Intravenous    Reason for Discontinue: Patient Transfer    Linked Group 1:  \"Followed by\" Linked Group Details        ampicillin 2 g/100 mL 0.9% NS (MBP) (Discontinued) 2 g Once 2/8/2017 2/8/2017    Sig - Route: Infuse 100 mL into a venous catheter 1 (One) Time. - Intravenous    Reason for Discontinue: Patient Transfer    Linked Group 1:  \"Followed by\" Linked Group Details        ASTROGLIDE gel 1 application (Discontinued) 1 bottle As Needed 2/7/2017 2/8/2017    Sig - Route: Apply 1 application topically As Needed (as needed for delivery). - Apply externally    benzocaine (AMERICAINE) 20 % rectal ointment 1 application (Discontinued) 1 application As Needed 2/8/2017 2/11/2017    Sig - Route: Insert 1 application into the rectum As Needed for hemorrhoids. - Rectal    Reason for Discontinue: Patient Discharge    benzocaine-lanolin-aloe vera (DERMOPLAST) 20-0.5 % topical spray (Discontinued)  As Needed 2/8/2017 2/11/2017    Sig - Route: Apply  topically As Needed for mild pain (1-3). - Topical    Reason for Discontinue: Patient Discharge    bisacodyl (DULCOLAX) suppository 10 mg (Discontinued) 10 mg Daily PRN 2/9/2017 2/11/2017    Sig - Route: Insert 1 suppository into the rectum Daily As Needed for constipation. - Rectal    Reason for Discontinue: Patient Discharge    butorphanol (STADOL) injection 1 mg (Discontinued) 1 mg 3 Times Daily PRN 2/7/2017 2/8/2017    Sig - Route: Infuse 1 mL into a venous catheter 3 (Three) Times a Day As Needed for moderate pain (4-6). - Intravenous    butorphanol (STADOL) injection 1 mg (Discontinued) 1 mg 2 Times Daily PRN 2/8/2017 2/8/2017    Sig - Route: Infuse 1 mL into a venous catheter 2 (Two) Times a Day As Needed for moderate pain (4-6). - Intravenous    carboprost (HEMABATE) injection 250 mcg (Discontinued) 250 mcg As Needed 2/7/2017 2/8/2017    Sig - Route: Inject 1 mL into the shoulder, thigh, or buttocks As Needed (hemorrhage). - Intramuscular    Reason for " Discontinue: Patient Transfer    citric acid-sodium citrate (BICITRA) solution 30 mL (Discontinued) 30 mL Once 2/8/2017 2/8/2017    Sig - Route: Take 30 mL by mouth 1 (One) Time. - Oral    Reason for Discontinue: Patient Transfer    docusate sodium (COLACE) capsule 100 mg (Discontinued) 100 mg Daily 2/9/2017 2/11/2017    Sig - Route: Take 1 capsule by mouth Daily. - Oral    Reason for Discontinue: Patient Discharge    ePHEDrine injection 5 mg (Discontinued) 5 mg Every 5 Minutes PRN 2/8/2017 2/8/2017    Sig - Route: Infuse 0.1 mL into a venous catheter Every 5 (Five) Minutes As Needed (hypotension). - Intravenous    Reason for Discontinue: Patient Transfer    famotidine (PEPCID) injection 20 mg (Discontinued) 20 mg Once As Needed 2/8/2017 2/8/2017    Sig - Route: Infuse 2 mL into a venous catheter 1 (One) Time As Needed (nausea). - Intravenous    Reason for Discontinue: Patient Transfer    FentaNYL Citrate (PF) (SUBLIMAZE) 100 MCG/2ML injection  - ADS Override Pull (Discontinued)   2/8/2017 2/11/2017    Notes to Pharmacy: Created by cabinet override    Reason for Discontinue: Patient Discharge    HYDROcodone-acetaminophen (NORCO)  MG per tablet 1 tablet (Discontinued) 1 tablet Every 6 Hours PRN 2/10/2017 2/10/2017    Sig - Route: Take 1 tablet by mouth Every 6 (Six) Hours As Needed for moderate pain (4-6). - Oral    HYDROcodone-acetaminophen (NORCO)  MG per tablet 1 tablet (Discontinued) 1 tablet Every 4 Hours PRN 2/10/2017 2/11/2017    Sig - Route: Take 1 tablet by mouth Every 4 (Four) Hours As Needed for moderate pain (4-6). - Oral    Reason for Discontinue: Patient Discharge    HYDROcodone-acetaminophen (NORCO) 5-325 MG per tablet 1 tablet (Discontinued) 1 tablet Every 4 Hours PRN 2/9/2017 2/11/2017    Sig - Route: Take 1 tablet by mouth Every 4 (Four) Hours As Needed for moderate pain (4-6). - Oral    Reason for Discontinue: Patient Discharge    hydrocortisone (ANUSOL-HC) 2.5 % rectal cream 1  application (Discontinued) 1 application As Needed 2/8/2017 2/11/2017    Sig - Route: Insert 1 application into the rectum As Needed for hemorrhoids. - Rectal    Reason for Discontinue: Patient Discharge    ibuprofen (ADVIL,MOTRIN) tablet 800 mg (Discontinued) 800 mg Every 8 Hours Scheduled 2/7/2017 2/8/2017    Sig - Route: Take 1 tablet by mouth Every 8 (Eight) Hours. - Oral    Reason for Discontinue: Patient Transfer    ibuprofen (ADVIL,MOTRIN) tablet 800 mg (Discontinued) 800 mg 3 Times Daily 2/8/2017 2/11/2017    Sig - Route: Take 1 tablet by mouth 3 (Three) Times a Day. - Oral    Reason for Discontinue: Patient Discharge    influenza vac split quad (FLUZONE/FLUARIX) injection 0.5 mL (Discontinued) 0.5 mL During Hospitalization 2/9/2017 2/11/2017    Sig - Route: Inject 0.5 mL into the shoulder, thigh, or buttocks During Hospitalization for immunization. - Intramuscular    Reason for Discontinue: Patient Discharge    Cosign for Ordering: Accepted by Eddie Garcia III, MD on 2/10/2017  5:24 PM    lactated ringers bolus 1,000 mL (Discontinued) 1,000 mL Once 2/8/2017 2/8/2017    Sig - Route: Infuse 1,000 mL into a venous catheter 1 (One) Time. - Intravenous    Reason for Discontinue: Patient Transfer    lactated ringers infusion (Discontinued) 125 mL/hr Continuous 2/7/2017 2/11/2017    Sig - Route: Infuse 125 mL/hr into a venous catheter Continuous. - Intravenous    Reason for Discontinue: Patient Discharge    lanolin ointment (Discontinued)  Every 1 Hour PRN 2/8/2017 2/11/2017    Sig - Route: Apply  topically Every 1 (One) Hour As Needed for dry skin (nipple pain). - Topical    Reason for Discontinue: Patient Discharge    lidocaine (XYLOCAINE) 1 % injection 5 mL (Discontinued) 5 mL As Needed 2/7/2017 2/8/2017    Sig - Route: Inject 5 mL into the skin As Needed (IV starts). - Intradermal    Reason for Discontinue: Patient Transfer    magnesium hydroxide (MILK OF MAGNESIA) suspension 2400 mg/10mL 10 mL (Discontinued)  "10 mL Daily PRN 2/8/2017 2/11/2017    Sig - Route: Take 10 mL by mouth Daily As Needed for constipation. - Oral    Reason for Discontinue: Patient Discharge    measles, mumps and rubella vaccine (MMR) injection 0.5 mL (Discontinued) 0.5 mL As Needed 2/8/2017 2/11/2017    Sig - Route: Inject 0.5 mL under the skin As Needed for immunization. - Subcutaneous    Reason for Discontinue: Patient Discharge    methylergonovine (METHERGINE) injection 200 mcg (Discontinued) 200 mcg As Needed 2/7/2017 2/8/2017    Sig - Route: Inject 1 mL into the shoulder, thigh, or buttocks As Needed (hemorrhage). - Intramuscular    Reason for Discontinue: Patient Transfer    metoclopramide (REGLAN) tablet 10 mg (Discontinued) 10 mg Once 2/8/2017 2/11/2017    Sig - Route: Take 1 tablet by mouth 1 (One) Time. - Oral    Reason for Discontinue: Patient Discharge    misoprostol (CYTOTEC) tablet 25 mcg (Discontinued) 25 mcg Every 3 Hours PRN 2/7/2017 2/8/2017    Sig - Route: Insert 0.25 tablets into the vagina Every 3 (Three) Hours As Needed (as needed for cervical ripening). - Vaginal    misoprostol (CYTOTEC) tablet 600 mcg (Discontinued) 600 mcg As Needed 2/7/2017 2/8/2017    Sig - Route: Insert 6 tablets into the rectum As Needed (hemorrhage). - Rectal    Reason for Discontinue: Patient Transfer    ondansetron (ZOFRAN) injection 4 mg (Discontinued) 4 mg Every 6 Hours PRN 2/8/2017 2/11/2017    Sig - Route: Infuse 2 mL into a venous catheter Every 6 (Six) Hours As Needed for nausea or vomiting. - Intravenous    Reason for Discontinue: Patient Discharge    Linked Group 2:  \"Or\" Linked Group Details        ondansetron (ZOFRAN) injection 4 mg (Discontinued) 4 mg Once As Needed 2/8/2017 2/8/2017    Sig - Route: Infuse 2 mL into a venous catheter 1 (One) Time As Needed for nausea or vomiting. - Intravenous    Reason for Discontinue: Patient Transfer    ondansetron (ZOFRAN) injection 4 mg (Discontinued) 4 mg Every 6 Hours PRN 2/8/2017 2/11/2017    Sig - " "Route: Infuse 2 mL into a venous catheter Every 6 (Six) Hours As Needed for nausea or vomiting. - Intravenous    Reason for Discontinue: Patient Discharge    Linked Group 3:  \"Or\" Linked Group Details        ondansetron (ZOFRAN) tablet 4 mg (Discontinued) 4 mg Every 6 Hours PRN 2/8/2017 2/11/2017    Sig - Route: Take 1 tablet by mouth Every 6 (Six) Hours As Needed for nausea or vomiting. - Oral    Reason for Discontinue: Patient Discharge    Linked Group 2:  \"Or\" Linked Group Details        ondansetron (ZOFRAN) tablet 4 mg (Discontinued) 4 mg Every 6 Hours PRN 2/8/2017 2/11/2017    Sig - Route: Take 1 tablet by mouth Every 6 (Six) Hours As Needed for nausea or vomiting. - Oral    Reason for Discontinue: Patient Discharge    Linked Group 3:  \"Or\" Linked Group Details        ondansetron ODT (ZOFRAN-ODT) disintegrating tablet 4 mg (Discontinued) 4 mg Every 6 Hours PRN 2/8/2017 2/11/2017    Sig - Route: Take 1 tablet by mouth Every 6 (Six) Hours As Needed for nausea or vomiting. - Oral    Reason for Discontinue: Patient Discharge    Linked Group 2:  \"Or\" Linked Group Details        ondansetron ODT (ZOFRAN-ODT) disintegrating tablet 4 mg (Discontinued) 4 mg Every 6 Hours PRN 2/8/2017 2/11/2017    Sig - Route: Take 1 tablet by mouth Every 6 (Six) Hours As Needed for nausea or vomiting. - Oral    Reason for Discontinue: Patient Discharge    Linked Group 3:  \"Or\" Linked Group Details        oxytocin (PITOCIN) in lactated Ringer's 1000 mL infusion solution (Discontinued) 2 livan-units/min Continuous 2/7/2017 2/8/2017    Sig - Route: Infuse 0.002 Units/min into a venous catheter Continuous. - Intravenous    pramoxine-hydrocortisone 1-1 % foam (Discontinued)  3 Times Daily PRN 2/8/2017 2/11/2017    Sig - Route: Apply  topically 3 (Three) Times a Day As Needed for hemorrhoids. - Topical    Reason for Discontinue: Patient Discharge    promethazine (PHENERGAN) 12.5 mg in sodium chloride 0.9 % 50 mL (Discontinued) 12.5 mg Every 6 " "Hours PRN 2/7/2017 2/8/2017    Sig - Route: Infuse 12.5 mg into a venous catheter Every 6 (Six) Hours As Needed for nausea or vomiting. - Intravenous    Reason for Discontinue: Patient Transfer    Linked Group 4:  \"Or\" Linked Group Details        promethazine (PHENERGAN) injection 12.5 mg (Discontinued) 12.5 mg Every 6 Hours PRN 2/7/2017 2/8/2017    Sig - Route: Inject 0.5 mL into the shoulder, thigh, or buttocks Every 6 (Six) Hours As Needed for nausea or vomiting. - Intramuscular    Reason for Discontinue: Patient Transfer    Linked Group 4:  \"Or\" Linked Group Details        promethazine (PHENERGAN) suppository 12.5 mg (Discontinued) 12.5 mg Every 6 Hours PRN 2/7/2017 2/8/2017    Sig - Route: Insert 1 suppository into the rectum Every 6 (Six) Hours As Needed for nausea or vomiting. - Rectal    Reason for Discontinue: Patient Transfer    Linked Group 4:  \"Or\" Linked Group Details        promethazine (PHENERGAN) tablet 12.5 mg (Discontinued) 12.5 mg Every 6 Hours PRN 2/7/2017 2/8/2017    Sig - Route: Take 0.5 tablets by mouth Every 6 (Six) Hours As Needed for nausea or vomiting. - Oral    Reason for Discontinue: Patient Transfer    Linked Group 4:  \"Or\" Linked Group Details        Rho D immune globulin (RHOPHYLAC) IM syringe 1,500 Units (Discontinued) 1,500 Units As Needed 2/8/2017 2/11/2017    Sig - Route: Inject 2 mL into the shoulder, thigh, or buttocks As Needed for isoimmunization. - Intramuscular    Reason for Discontinue: Patient Discharge    ropivacaine (NAROPIN) 0.2 % injection (Discontinued) 14 mL/hr Continuous 2/8/2017 2/8/2017    Sig - Route: 28 mg/hr by Epidural route Continuous. - Epidural    sodium chloride (NS) irrigation solution 3,000 mL (Discontinued) 3,000 mL Once 2/7/2017 2/11/2017    Sig - Route: Irrigate with 3,000 mL as directed 1 (One) Time. - Irrigation    Reason for Discontinue: Patient Discharge    sodium chloride 0.9 % flush 1-10 mL (Discontinued) 1-10 mL As Needed 2/7/2017 2/8/2017    " Sig - Route: Infuse 1-10 mL into a venous catheter As Needed for line care. - Intravenous    Reason for Discontinue: Patient Transfer    sodium chloride 0.9 % flush 1-10 mL (Discontinued) 1-10 mL As Needed 2/8/2017 2/11/2017    Sig - Route: Infuse 1-10 mL into a venous catheter As Needed for line care. - Intravenous    Reason for Discontinue: Patient Discharge    terbutaline (BRETHINE) injection 0.25 mg (Discontinued) 0.25 mg As Needed 2/7/2017 2/8/2017    Sig - Route: Inject 0.25 mL under the skin As Needed (fetal hyperstimulation/distress). - Subcutaneous    Reason for Discontinue: Patient Transfer    witch hazel-glycerin (TUCKS) pad 1 pad (Discontinued) 1 each As Needed 2/8/2017 2/11/2017    Sig - Route: Apply 1 pad topically As Needed for irritation or hemorrhoids. - Topical    Reason for Discontinue: Patient Discharge    zolpidem (AMBIEN) tablet 5 mg (Discontinued) 5 mg Nightly PRN 2/7/2017 2/8/2017    Sig - Route: Take 1 tablet by mouth At Night As Needed for sleep. - Oral    Reason for Discontinue: Duplicate order    zolpidem (AMBIEN) tablet 5 mg (Discontinued) 5 mg Nightly PRN 2/8/2017 2/11/2017    Sig - Route: Take 1 tablet by mouth At Night As Needed for sleep. - Oral    Reason for Discontinue: Patient Discharge          Lab Results (all)     Procedure Component Value Units Date/Time    Group B Strep Culture [[id number]] Resulted:  01/17/17     Specimen:  Swab Updated:  02/07/17 0613     External Strep Group B Ag Negative     CBC (No Diff) [[id number]]  (Abnormal) Collected:  02/07/17 0640    Specimen:  Blood Updated:  02/07/17 0703     WBC 10.05 10*3/mm3      RBC 4.17 (L) 10*6/mm3      Hemoglobin 10.8 (L) g/dL      Hematocrit 33.5 (L) %      MCV 80.3 fL      MCH 25.9 (L) pg      MCHC 32.2 (L) g/dL      RDW 14.8 (H) %      RDW-SD 43.4 fl      MPV 10.8 (H) fL      Platelets 195 10*3/mm3     Hepatitis B Surface Antigen [[id number]] Resulted:  06/22/16     Specimen:  Blood Updated:  02/07/17 1757     External  Hepatitis B Surface Ag Negative     CBC & Differential [[id number]] Collected:  02/09/17 0713    Specimen:  Blood Updated:  02/09/17 0801    Narrative:       The following orders were created for panel order CBC & Differential.  Procedure                               Abnormality         Status                     ---------                               -----------         ------                     CBC Auto Differential[[id number]]         Abnormal            Final result                 Please view results for these tests on the individual orders.    CBC Auto Differential [[id number]]  (Abnormal) Collected:  02/09/17 0713    Specimen:  Blood Updated:  02/09/17 0801     WBC 12.16 10*3/mm3      RBC 3.81 (L) 10*6/mm3      Hemoglobin 9.6 (L) g/dL      Hematocrit 30.7 (L) %      MCV 80.6 fL      MCH 25.2 (L) pg      MCHC 31.3 (L) g/dL      RDW 15.2 (H) %      RDW-SD 44.2 fl      MPV 10.8 (H) fL      Platelets 176 10*3/mm3      Neutrophil % 71.9 (H) %      Lymphocyte % 14.5 (L) %      Monocyte % 12.2 (H) %      Eosinophil % 0.8 %      Basophil % 0.2 %      Immature Grans % 0.4 %      Neutrophils, Absolute 8.75 (H) 10*3/mm3      Lymphocytes, Absolute 1.76 10*3/mm3      Monocytes, Absolute 1.48 (H) 10*3/mm3      Eosinophils, Absolute 0.10 10*3/mm3      Basophils, Absolute 0.02 10*3/mm3      Immature Grans, Absolute 0.05 (H) 10*3/mm3           Orders (last 72 hrs)     Start     Ordered    02/11/17 1212  Discharge patient  Once      02/11/17 1211    02/11/17 0000  ibuprofen (ADVIL,MOTRIN) 600 MG tablet  Every 6 Hours PRN      02/11/17 1211    02/10/17 0938  HYDROcodone-acetaminophen (NORCO)  MG per tablet 1 tablet  Every 4 Hours PRN,   Status:  Discontinued      02/10/17 0938    02/10/17 0937  HYDROcodone-acetaminophen (NORCO)  MG per tablet 1 tablet  Every 6 Hours PRN,   Status:  Discontinued      02/10/17 0937    02/09/17 1512  influenza vac split quad (FLUZONE/FLUARIX) injection 0.5 mL  During Hospitalization,    Status:  Discontinued      02/09/17 1513    02/09/17 0900  docusate sodium (COLACE) capsule 100 mg  Daily,   Status:  Discontinued      02/08/17 2126 02/09/17 0036  HYDROcodone-acetaminophen (NORCO) 5-325 MG per tablet 1 tablet  Every 4 Hours PRN,   Status:  Discontinued      02/09/17 0036    02/09/17 0000  bisacodyl (DULCOLAX) suppository 10 mg  Daily PRN,   Status:  Discontinued      02/08/17 2126 02/08/17 2300  metoclopramide (REGLAN) tablet 10 mg  Once,   Status:  Discontinued      02/08/17 2126 02/08/17 2200  ibuprofen (ADVIL,MOTRIN) tablet 800 mg  3 Times Daily,   Status:  Discontinued      02/08/17 2126 02/08/17 2146  benzocaine-lanolin-aloe vera (DERMOPLAST) 20-0.5 % topical spray  As Needed,   Status:  Discontinued      02/08/17 2147 02/08/17 2145  pramoxine-hydrocortisone 1-1 % foam  3 Times Daily PRN,   Status:  Discontinued      02/08/17 2147 02/08/17 2126  Rho D immune globulin (RHOPHYLAC) IM syringe 1,500 Units  As Needed,   Status:  Discontinued      02/08/17 2126 02/08/17 2126  zolpidem (AMBIEN) tablet 5 mg  Nightly PRN,   Status:  Discontinued      02/08/17 2126 02/08/17 2126  magnesium hydroxide (MILK OF MAGNESIA) suspension 2400 mg/10mL 10 mL  Daily PRN,   Status:  Discontinued      02/08/17 2126 02/08/17 2126  ondansetron (ZOFRAN) tablet 4 mg  Every 6 Hours PRN,   Status:  Discontinued      02/08/17 2126 02/08/17 2126  ondansetron ODT (ZOFRAN-ODT) disintegrating tablet 4 mg  Every 6 Hours PRN,   Status:  Discontinued      02/08/17 2126 02/08/17 2126  ondansetron (ZOFRAN) injection 4 mg  Every 6 Hours PRN,   Status:  Discontinued      02/08/17 2126 02/08/17 2126  lanolin ointment  Every 1 Hour PRN,   Status:  Discontinued      02/08/17 2126 02/08/17 2126  witch hazel-glycerin (TUCKS) pad 1 pad  As Needed,   Status:  Discontinued      02/08/17 2126 02/08/17 2126  hydrocortisone (ANUSOL-HC) 2.5 % rectal cream 1 application  As Needed,   Status:  Discontinued       02/08/17 2126 02/08/17 2126  benzocaine (AMERICAINE) 20 % rectal ointment 1 application  As Needed,   Status:  Discontinued      02/08/17 2126 02/08/17 2126  measles, mumps and rubella vaccine (MMR) injection 0.5 mL  As Needed,   Status:  Discontinued      02/08/17 2126 02/08/17 2126  Apply Ice to Perineum  As Needed,   Status:  Canceled     Comments:  For 20 min q 2 hrs    02/08/17 2126 02/08/17 2126  Kpad  As Needed,   Status:  Canceled     Comments:  For pain    02/08/17 2126 02/08/17 2126  Warm compress  As Needed,   Status:  Canceled      02/08/17 2126 02/08/17 2126  Apply ace wrap, tight bra, or binder  As Needed,   Status:  Canceled      02/08/17 2126 02/08/17 2126  Apply ice packs  As Needed,   Status:  Canceled      02/08/17 2126 02/08/17 2126  sodium chloride 0.9 % flush 1-10 mL  As Needed,   Status:  Discontinued      02/08/17 2126 02/08/17 2126  acetaminophen (TYLENOL) tablet 650 mg  Every 4 Hours PRN,   Status:  Discontinued      02/08/17 2126 02/08/17 2126  Apply Ice to Perineum  As Needed,   Status:  Canceled      02/08/17 2126 02/08/17 2126  Bladder Assessment  As Needed,   Status:  Canceled      02/08/17 2126 02/08/17 1146  FentaNYL Citrate (PF) (SUBLIMAZE) 100 MCG/2ML injection  - ADS Override Pull  Status:  Discontinued     Comments:  Created by cabinet override    02/08/17 1146    02/08/17 0854  ondansetron (ZOFRAN) tablet 4 mg  Every 6 Hours PRN,   Status:  Discontinued      02/08/17 0854    02/08/17 0854  ondansetron ODT (ZOFRAN-ODT) disintegrating tablet 4 mg  Every 6 Hours PRN,   Status:  Discontinued      02/08/17 0854    02/08/17 0854  ondansetron (ZOFRAN) injection 4 mg  Every 6 Hours PRN,   Status:  Discontinued      02/08/17 0854    02/07/17 0945  lactated ringers infusion  Continuous,   Status:  Discontinued      02/07/17 0912    02/07/17 0830  sodium chloride (NS) irrigation solution 3,000 mL  Once,   Status:  Discontinued      02/07/17 0755     02/07/17 0000  Group B Strep Culture     Comments:  This is an external result entered through the Results Console.    02/07/17 0613    02/07/17 0000  Hepatitis B Surface Antigen     Comments:  This is an external result entered through the Results Console.    02/07/17 1757             Physician Progress Notes (all)      Saniya Be DO at 2/9/2017  9:12 AM  Version 1 of 1         [facility]  Vaginal Delivery Progress Note    Subjective   Subjective  Postpartum Day 1: Vaginal Delivery    The patient feels well.  Her pain is well controlled with nonsteroidal anti-inflammatory drugs.   She is ambulating well.  Patient describes her bleeding as moderate lochia.    Breastfeeding: infant latching.    Objective     Objective   Vital Signs Range for the last 24 hours  Temperature: Temp:  [96.6 °F (35.9 °C)-99 °F (37.2 °C)] 97.4 °F (36.3 °C)   Temp Source: Temp src: Oral   BP: BP: ()/() 116/67   Pulse: Heart Rate:  [] 81   Respirations: Resp:  [16-22] 16   Weight:       Admit Height:       Physical Exam:  General:  no acute distresss.  Abdomen: Fundus: appropriate, firm, non tender  Extremities: normal, atraumatic, no cyanosis, and trace edema.     [unfilled]       Lab Results   Component Value Date    ABO A 02/07/2017    RH Positive 02/07/2017        Lab Results   Component Value Date    HGB 9.6 (L) 02/09/2017    HCT 30.7 (L) 02/09/2017         Assessment&#47;Plan   Active Problems:    Pregnancy    39 weeks gestation of pregnancy      Rachel Alred is Day 1  post-partum  Vaginal, Vacuum (Extractor)  repaired with 2.0 chromic .      Plan:  Continue current care.      Saniya Be DO  2/9/2017  9:12 AM       Electronically signed by Saniya Be DO at 2/9/2017  9:12 AM      Marlon Marquez DO at 2/10/2017  8:59 AM  Version 3 of 3         [facility]  Vaginal Delivery Progress Note    Subjective   Subjective  Postpartum Day 2: Vaginal Delivery    The patient feels well.   Her pain is well controlled with nonsteroidal anti-inflammatory drugs.   She is ambulating well.  Patient describes her bleeding as moderate lochia.    Breastfeeding: infant latching. Mother concerned bc baby with elevated bilirubin and thought it was because she was not feeding correctly.  Reassured today.  Educated to feed baby every 2-3 hours will consult lactation consultant and advise nursery of mother concerns.      Objective     Objective   Vital Signs Range for the last 24 hours  Temperature: Temp:  [97.7 °F (36.5 °C)-98.1 °F (36.7 °C)] 97.7 °F (36.5 °C)   Temp Source: Temp src: Oral   BP: BP: (120-127)/(64-67) 120/67   Pulse: Heart Rate:  [] 97   Respirations: Resp:  [16] 16   Weight:       Admit Height:       Physical Exam:  General:  no acute distresss.  Abdomen: Fundus: appropriate, firm, non tender  Extremities: normal, atraumatic, no cyanosis, and trace edema.       [unfilled]       Lab Results   Component Value Date    ABO A 02/07/2017    RH Positive 02/07/2017        Lab Results   Component Value Date    HGB 9.6 (L) 02/09/2017    HCT 30.7 (L) 02/09/2017         Assessment&#47;Plan   Assessment & Plan  Active Problems:    Pregnancy    39 weeks gestation of pregnancy      Rachel Escobar is Day 2  post-partum  Vaginal, Vacuum (Extractor)  repaired with 2.0 chromic .      Plan:  Continue current care baby not going home today under phototherapy, breastfeeding counseling today      CORTNEY [place]  2/10/2017  8:59 AM       Electronically signed by Marlon Marquez DO at 2/12/2017 11:39 AM        Marlon Marquez DO at 2/11/2017 12:12 PM  Version 1 of [address] [place] [address]  Vaginal Delivery Progress Note    Subjective   Postpartum Day 3: Vaginal Delivery    The patient feels well.  Denies complaints.  Lochia is light.      Objective     Vital Signs Range for the last 24 hours  Temperature: Temp:  [97.9 °F (36.6 °C)-98 °F (36.7 °C)] 97.9 °F (36.6 °C)   Temp Source: Temp src: Oral   BP:  BP: (120-130)/(74-76) 130/76   Pulse: Heart Rate:  [100-106] 106   Respirations: Resp:  [18-20] 20   SPO2:     O2 Amount (l/min):     O2 Devices     Weight:       Admit Height:         Physical Exam:  General:  no acute distresss.  Abdomen: Fundus: appropriate, firm, non tender  Extremities: normal, atraumatic, no cyanosis, and trace edema.       Lab results reviewed:  Yes       Assessment&#47;Plan     Normal postpartum state      Plan:  Discharge home with standard precautions and return to clinic in 4-6 weeks.      Marlon Marquez DO  2017  12:12 PM     Electronically signed by Marlon Marquez DO at 2017 12:12 PM           Discharge Summary      Marlon Marquez DO at 2017 12:12 PM          [facility]  Delivery Discharge Summary    Primary OB Clinician: Marlon Marquez DO    EDC: Estimated Date of Delivery: 17    Gestational Age:40w6d    Antepartum complications: none    Date of Delivery: 2017   Time of Delivery: 6:15 PM     Delivered By:  Marlon Marquez     Delivery Type: Vaginal, Vacuum (Extractor)      Tubal Ligation: n/a    Baby:@BABYNOHDR(SEX)@ infant;   Apgar:  8   @ 1 minute /   Apgar:  9   @ 5 minutes   Weight: @BABYNOHDR(BIRTHWEIGHT)@   Length: @BABYNOHDR(DELRECITEM,HSB,98431,,,,)@    Anesthesia: Epidural      Intrapartum complications: None    Laceration: Yes  Laceration Information  Laceration Repaired?   Perineal: None       Periurethral:         Labial:         Sulcus: bilateral  Yes    Vaginal: Yes       Cervical: No            Episiotomy: No    Placenta: Spontaneous     Feeding method: Breastfeeding Status: Yes    Discharge Date: 2017; Discharge Time: 12:13 PM    Plan:    Follow-up appointment with primary OB/GYN in 2 weeks.     Electronically signed by Marlon Marquez DO at 2017 12:13 PM

## 2017-10-10 LAB
EXTERNAL ABO GROUPING: NORMAL
EXTERNAL ANTIBODY SCREEN: NEGATIVE
EXTERNAL HEMATOCRIT: 40 %
EXTERNAL HEMOGLOBIN: 13.9 G/DL
EXTERNAL HEPATITIS B SURFACE ANTIGEN: NEGATIVE
EXTERNAL RH FACTOR: POSITIVE
EXTERNAL RUBELLA QUALITATIVE: NORMAL
EXTERNAL SYPHILIS RPR SCREEN: NORMAL
HIV1 P24 AG SERPL QL IA: NORMAL

## 2018-01-22 ENCOUNTER — TRANSCRIBE ORDERS (OUTPATIENT)
Dept: ADMINISTRATIVE | Facility: HOSPITAL | Age: 28
End: 2018-01-22

## 2018-01-22 DIAGNOSIS — R00.2 PALPITATION: Primary | ICD-10-CM

## 2018-01-25 ENCOUNTER — APPOINTMENT (OUTPATIENT)
Dept: CARDIOLOGY | Facility: HOSPITAL | Age: 28
End: 2018-01-25
Attending: OBSTETRICS & GYNECOLOGY

## 2018-02-28 LAB — EXTERNAL GTT 3 HOURS: 111

## 2018-04-06 ENCOUNTER — HOSPITAL ENCOUNTER (OUTPATIENT)
Facility: HOSPITAL | Age: 28
Discharge: HOME | End: 2018-04-06
Attending: OBSTETRICS & GYNECOLOGY | Admitting: OBSTETRICS & GYNECOLOGY

## 2018-04-06 VITALS
SYSTOLIC BLOOD PRESSURE: 107 MMHG | RESPIRATION RATE: 18 BRPM | BODY MASS INDEX: 35.5 KG/M2 | WEIGHT: 188 LBS | DIASTOLIC BLOOD PRESSURE: 70 MMHG | HEART RATE: 127 BPM | HEIGHT: 61 IN | TEMPERATURE: 98.1 F

## 2018-04-06 PROCEDURE — 59025 FETAL NON-STRESS TEST: CPT

## 2018-04-06 NOTE — NON STRESS TEST
Rachel Abrahamriaz, a  at Unknown with an CONCEPCION of Not found., was seen at [facility] LABOR DELIVERY for a nonstress test.    Chief Complaint   Patient presents with   • Non-stress Test     OLIGO AND IUGR                Laura Ireland, RN  FETAL NONSTRESS TEST REPORT      Gestational age: As recorded in hospital chart    Indication: See hospital chart    Accelerations: Present    Baseline fetal heart rate: 150    Decelerations: Few variables present    Conclusion: Reactive

## 2018-04-17 LAB
EXTERNAL CHLAMYDIA SCREEN: NEGATIVE
EXTERNAL GONORRHEA SCREEN: NEGATIVE
EXTERNAL GROUP B STREP ANTIGEN: NEGATIVE

## 2018-05-03 ENCOUNTER — HOSPITAL ENCOUNTER (INPATIENT)
Facility: HOSPITAL | Age: 28
LOS: 2 days | Discharge: HOME | End: 2018-05-06
Attending: OBSTETRICS & GYNECOLOGY | Admitting: OBSTETRICS & GYNECOLOGY

## 2018-05-03 LAB
ABO GROUP BLD: NORMAL
BLD GP AB SCN SERPL QL: NEGATIVE
DEPRECATED RDW RBC AUTO: 42.9 FL (ref 37–54)
ERYTHROCYTE [DISTWIDTH] IN BLOOD BY AUTOMATED COUNT: 15.5 % (ref 11.5–14.5)
HCT VFR BLD AUTO: 32.4 % (ref 37–47)
HGB BLD-MCNC: 10.3 G/DL (ref 12–16)
MCH RBC QN AUTO: 25.1 PG (ref 27–33)
MCHC RBC AUTO-ENTMCNC: 31.8 G/DL (ref 33–37)
MCV RBC AUTO: 79 FL (ref 80–94)
PLATELET # BLD AUTO: 214 10*3/MM3 (ref 130–400)
PMV BLD AUTO: 10.6 FL (ref 6–10)
RBC # BLD AUTO: 4.1 10*6/MM3 (ref 4.2–5.4)
RH BLD: POSITIVE
T&S EXPIRATION DATE: NORMAL
WBC NRBC COR # BLD: 11.87 10*3/MM3 (ref 4.5–12.5)

## 2018-05-03 PROCEDURE — 85027 COMPLETE CBC AUTOMATED: CPT | Performed by: OBSTETRICS & GYNECOLOGY

## 2018-05-03 PROCEDURE — 86900 BLOOD TYPING SEROLOGIC ABO: CPT | Performed by: OBSTETRICS & GYNECOLOGY

## 2018-05-03 PROCEDURE — 59025 FETAL NON-STRESS TEST: CPT

## 2018-05-03 PROCEDURE — 86901 BLOOD TYPING SEROLOGIC RH(D): CPT | Performed by: OBSTETRICS & GYNECOLOGY

## 2018-05-03 PROCEDURE — 86850 RBC ANTIBODY SCREEN: CPT | Performed by: OBSTETRICS & GYNECOLOGY

## 2018-05-03 RX ORDER — DIPHENHYDRAMINE HYDROCHLORIDE 50 MG/ML
25 INJECTION INTRAMUSCULAR; INTRAVENOUS NIGHTLY PRN
Status: DISCONTINUED | OUTPATIENT
Start: 2018-05-03 | End: 2018-05-04 | Stop reason: HOSPADM

## 2018-05-03 RX ORDER — DIPHENHYDRAMINE HCL 25 MG
25 CAPSULE ORAL NIGHTLY PRN
Status: DISCONTINUED | OUTPATIENT
Start: 2018-05-03 | End: 2018-05-04 | Stop reason: HOSPADM

## 2018-05-03 RX ORDER — ONDANSETRON 4 MG/1
4 TABLET, FILM COATED ORAL EVERY 6 HOURS PRN
Status: DISCONTINUED | OUTPATIENT
Start: 2018-05-03 | End: 2018-05-04 | Stop reason: HOSPADM

## 2018-05-03 RX ORDER — PROMETHAZINE HYDROCHLORIDE 25 MG/ML
12.5 INJECTION, SOLUTION INTRAMUSCULAR; INTRAVENOUS EVERY 6 HOURS PRN
Status: DISCONTINUED | OUTPATIENT
Start: 2018-05-03 | End: 2018-05-04 | Stop reason: HOSPADM

## 2018-05-03 RX ORDER — OXYTOCIN/RINGER'S LACTATE 20/1000 ML
2-30 PLASTIC BAG, INJECTION (ML) INTRAVENOUS
Status: DISCONTINUED | OUTPATIENT
Start: 2018-05-04 | End: 2018-05-04 | Stop reason: HOSPADM

## 2018-05-03 RX ORDER — ACETAMINOPHEN 325 MG/1
650 TABLET ORAL EVERY 4 HOURS PRN
Status: DISCONTINUED | OUTPATIENT
Start: 2018-05-03 | End: 2018-05-04 | Stop reason: HOSPADM

## 2018-05-03 RX ORDER — MAGNESIUM HYDROXIDE 1200 MG/15ML
1000 LIQUID ORAL ONCE AS NEEDED
Status: DISCONTINUED | OUTPATIENT
Start: 2018-05-03 | End: 2018-05-04 | Stop reason: HOSPADM

## 2018-05-03 RX ORDER — TERBUTALINE SULFATE 1 MG/ML
0.25 INJECTION, SOLUTION SUBCUTANEOUS AS NEEDED
Status: DISCONTINUED | OUTPATIENT
Start: 2018-05-03 | End: 2018-05-04 | Stop reason: HOSPADM

## 2018-05-03 RX ORDER — SODIUM CHLORIDE, SODIUM LACTATE, POTASSIUM CHLORIDE, CALCIUM CHLORIDE 600; 310; 30; 20 MG/100ML; MG/100ML; MG/100ML; MG/100ML
125 INJECTION, SOLUTION INTRAVENOUS CONTINUOUS
Status: DISCONTINUED | OUTPATIENT
Start: 2018-05-03 | End: 2018-05-06 | Stop reason: HOSPADM

## 2018-05-03 RX ORDER — ONDANSETRON 4 MG/1
4 TABLET, ORALLY DISINTEGRATING ORAL EVERY 6 HOURS PRN
Status: DISCONTINUED | OUTPATIENT
Start: 2018-05-03 | End: 2018-05-04 | Stop reason: HOSPADM

## 2018-05-03 RX ORDER — ZOLPIDEM TARTRATE 5 MG/1
5 TABLET ORAL NIGHTLY PRN
Status: DISCONTINUED | OUTPATIENT
Start: 2018-05-03 | End: 2018-05-04 | Stop reason: HOSPADM

## 2018-05-03 RX ORDER — MISOPROSTOL 100 UG/1
25 TABLET ORAL
Status: DISPENSED | OUTPATIENT
Start: 2018-05-04 | End: 2018-05-04

## 2018-05-03 RX ORDER — SODIUM CHLORIDE, SODIUM LACTATE, POTASSIUM CHLORIDE, CALCIUM CHLORIDE 600; 310; 30; 20 MG/100ML; MG/100ML; MG/100ML; MG/100ML
INJECTION, SOLUTION INTRAVENOUS
Status: COMPLETED
Start: 2018-05-03 | End: 2018-05-03

## 2018-05-03 RX ORDER — PROMETHAZINE HYDROCHLORIDE 12.5 MG/1
12.5 SUPPOSITORY RECTAL EVERY 6 HOURS PRN
Status: DISCONTINUED | OUTPATIENT
Start: 2018-05-03 | End: 2018-05-04 | Stop reason: HOSPADM

## 2018-05-03 RX ORDER — ONDANSETRON 2 MG/ML
4 INJECTION INTRAMUSCULAR; INTRAVENOUS EVERY 6 HOURS PRN
Status: DISCONTINUED | OUTPATIENT
Start: 2018-05-03 | End: 2018-05-04 | Stop reason: HOSPADM

## 2018-05-03 RX ORDER — PROMETHAZINE HYDROCHLORIDE 25 MG/1
12.5 TABLET ORAL EVERY 6 HOURS PRN
Status: DISCONTINUED | OUTPATIENT
Start: 2018-05-03 | End: 2018-05-04 | Stop reason: HOSPADM

## 2018-05-03 RX ORDER — PRENATAL VIT NO.126/IRON/FOLIC 28MG-0.8MG
1 TABLET ORAL NIGHTLY
COMMUNITY

## 2018-05-03 RX ORDER — BUTORPHANOL TARTRATE 1 MG/ML
1 INJECTION, SOLUTION INTRAMUSCULAR; INTRAVENOUS
Status: DISCONTINUED | OUTPATIENT
Start: 2018-05-03 | End: 2018-05-04 | Stop reason: HOSPADM

## 2018-05-03 RX ADMIN — SODIUM CHLORIDE, POTASSIUM CHLORIDE, SODIUM LACTATE AND CALCIUM CHLORIDE 125 ML/HR: 600; 310; 30; 20 INJECTION, SOLUTION INTRAVENOUS at 22:50

## 2018-05-04 ENCOUNTER — ANESTHESIA (OUTPATIENT)
Dept: LABOR AND DELIVERY | Facility: HOSPITAL | Age: 28
End: 2018-05-04

## 2018-05-04 ENCOUNTER — ANESTHESIA EVENT (OUTPATIENT)
Dept: LABOR AND DELIVERY | Facility: HOSPITAL | Age: 28
End: 2018-05-04

## 2018-05-04 ENCOUNTER — APPOINTMENT (OUTPATIENT)
Dept: LABOR AND DELIVERY | Facility: HOSPITAL | Age: 28
End: 2018-05-04

## 2018-05-04 PROCEDURE — C1755 CATHETER, INTRASPINAL: HCPCS

## 2018-05-04 PROCEDURE — 25010000002 ROPIVACAINE PER 1 MG: Performed by: ANESTHESIOLOGY

## 2018-05-04 PROCEDURE — C1755 CATHETER, INTRASPINAL: HCPCS | Performed by: ANESTHESIOLOGY

## 2018-05-04 RX ORDER — ROPIVACAINE HYDROCHLORIDE 2 MG/ML
INJECTION, SOLUTION EPIDURAL; INFILTRATION; PERINEURAL
Status: COMPLETED
Start: 2018-05-04 | End: 2018-05-04

## 2018-05-04 RX ORDER — SODIUM CHLORIDE 0.9 % (FLUSH) 0.9 %
1-10 SYRINGE (ML) INJECTION AS NEEDED
Status: DISCONTINUED | OUTPATIENT
Start: 2018-05-04 | End: 2018-05-06 | Stop reason: HOSPADM

## 2018-05-04 RX ORDER — METOCLOPRAMIDE 10 MG/1
10 TABLET ORAL ONCE
Status: DISCONTINUED | OUTPATIENT
Start: 2018-05-04 | End: 2018-05-06 | Stop reason: HOSPADM

## 2018-05-04 RX ORDER — DOCUSATE SODIUM 100 MG/1
100 CAPSULE, LIQUID FILLED ORAL DAILY
Status: DISCONTINUED | OUTPATIENT
Start: 2018-05-04 | End: 2018-05-06 | Stop reason: HOSPADM

## 2018-05-04 RX ORDER — METHYLERGONOVINE MALEATE 0.2 MG/ML
200 INJECTION INTRAVENOUS ONCE AS NEEDED
Status: DISCONTINUED | OUTPATIENT
Start: 2018-05-04 | End: 2018-05-04 | Stop reason: HOSPADM

## 2018-05-04 RX ORDER — MISOPROSTOL 100 UG/1
800 TABLET ORAL AS NEEDED
Status: DISCONTINUED | OUTPATIENT
Start: 2018-05-04 | End: 2018-05-04 | Stop reason: HOSPADM

## 2018-05-04 RX ORDER — ONDANSETRON 4 MG/1
4 TABLET, FILM COATED ORAL EVERY 6 HOURS PRN
Status: DISCONTINUED | OUTPATIENT
Start: 2018-05-04 | End: 2018-05-06 | Stop reason: HOSPADM

## 2018-05-04 RX ORDER — FAMOTIDINE 10 MG/ML
20 INJECTION, SOLUTION INTRAVENOUS ONCE AS NEEDED
Status: DISCONTINUED | OUTPATIENT
Start: 2018-05-04 | End: 2018-05-04 | Stop reason: HOSPADM

## 2018-05-04 RX ORDER — ACETAMINOPHEN 325 MG/1
650 TABLET ORAL EVERY 4 HOURS PRN
Status: DISCONTINUED | OUTPATIENT
Start: 2018-05-04 | End: 2018-05-06 | Stop reason: HOSPADM

## 2018-05-04 RX ORDER — IBUPROFEN 800 MG/1
800 TABLET ORAL 3 TIMES DAILY
Status: DISCONTINUED | OUTPATIENT
Start: 2018-05-04 | End: 2018-05-06 | Stop reason: HOSPADM

## 2018-05-04 RX ORDER — CARBOPROST TROMETHAMINE 250 UG/ML
250 INJECTION, SOLUTION INTRAMUSCULAR AS NEEDED
Status: DISCONTINUED | OUTPATIENT
Start: 2018-05-04 | End: 2018-05-04 | Stop reason: HOSPADM

## 2018-05-04 RX ORDER — OXYTOCIN/RINGER'S LACTATE 20/1000 ML
2 PLASTIC BAG, INJECTION (ML) INTRAVENOUS CONTINUOUS
Status: DISCONTINUED | OUTPATIENT
Start: 2018-05-04 | End: 2018-05-06 | Stop reason: HOSPADM

## 2018-05-04 RX ORDER — ONDANSETRON 2 MG/ML
4 INJECTION INTRAMUSCULAR; INTRAVENOUS EVERY 6 HOURS PRN
Status: DISCONTINUED | OUTPATIENT
Start: 2018-05-04 | End: 2018-05-06 | Stop reason: HOSPADM

## 2018-05-04 RX ORDER — OXYTOCIN/RINGER'S LACTATE 20/1000 ML
PLASTIC BAG, INJECTION (ML) INTRAVENOUS
Status: COMPLETED
Start: 2018-05-04 | End: 2018-05-04

## 2018-05-04 RX ORDER — BISACODYL 10 MG
10 SUPPOSITORY, RECTAL RECTAL DAILY PRN
Status: DISCONTINUED | OUTPATIENT
Start: 2018-05-05 | End: 2018-05-06 | Stop reason: HOSPADM

## 2018-05-04 RX ORDER — LANOLIN 100 %
OINTMENT (GRAM) TOPICAL
Status: DISCONTINUED | OUTPATIENT
Start: 2018-05-04 | End: 2018-05-06 | Stop reason: HOSPADM

## 2018-05-04 RX ORDER — PRENATAL VIT/IRON FUM/FOLIC AC 27MG-0.8MG
1 TABLET ORAL DAILY
Status: DISCONTINUED | OUTPATIENT
Start: 2018-05-05 | End: 2018-05-06 | Stop reason: HOSPADM

## 2018-05-04 RX ORDER — ZOLPIDEM TARTRATE 5 MG/1
5 TABLET ORAL NIGHTLY PRN
Status: DISCONTINUED | OUTPATIENT
Start: 2018-05-04 | End: 2018-05-06 | Stop reason: HOSPADM

## 2018-05-04 RX ORDER — ONDANSETRON 2 MG/ML
4 INJECTION INTRAMUSCULAR; INTRAVENOUS ONCE AS NEEDED
Status: DISCONTINUED | OUTPATIENT
Start: 2018-05-04 | End: 2018-05-04 | Stop reason: HOSPADM

## 2018-05-04 RX ORDER — ONDANSETRON 4 MG/1
4 TABLET, ORALLY DISINTEGRATING ORAL EVERY 6 HOURS PRN
Status: DISCONTINUED | OUTPATIENT
Start: 2018-05-04 | End: 2018-05-06 | Stop reason: HOSPADM

## 2018-05-04 RX ORDER — LIDOCAINE HYDROCHLORIDE 20 MG/ML
INJECTION, SOLUTION EPIDURAL; INFILTRATION; INTRACAUDAL; PERINEURAL AS NEEDED
Status: DISCONTINUED | OUTPATIENT
Start: 2018-05-04 | End: 2018-05-07 | Stop reason: SURG

## 2018-05-04 RX ORDER — ROPIVACAINE HYDROCHLORIDE 2 MG/ML
14 INJECTION, SOLUTION EPIDURAL; INFILTRATION; PERINEURAL CONTINUOUS
Status: DISCONTINUED | OUTPATIENT
Start: 2018-05-04 | End: 2018-05-06 | Stop reason: HOSPADM

## 2018-05-04 RX ORDER — LIDOCAINE HYDROCHLORIDE 20 MG/ML
INJECTION, SOLUTION EPIDURAL; INFILTRATION; INTRACAUDAL; PERINEURAL
Status: COMPLETED
Start: 2018-05-04 | End: 2018-05-04

## 2018-05-04 RX ORDER — EPHEDRINE SULFATE 50 MG/ML
10 INJECTION, SOLUTION INTRAVENOUS
Status: DISCONTINUED | OUTPATIENT
Start: 2018-05-04 | End: 2018-05-04 | Stop reason: HOSPADM

## 2018-05-04 RX ADMIN — OXYTOCIN 2 MILLI-UNITS/MIN: 10 INJECTION INTRAVENOUS at 07:59

## 2018-05-04 RX ADMIN — HYDROCORTISONE 2.5% 1 APPLICATION: 25 CREAM TOPICAL at 19:00

## 2018-05-04 RX ADMIN — LIDOCAINE HYDROCHLORIDE 10 ML: 20 INJECTION, SOLUTION EPIDURAL; INFILTRATION; INTRACAUDAL; PERINEURAL at 09:44

## 2018-05-04 RX ADMIN — EPHEDRINE SULFATE 10 MG: 50 INJECTION INTRAVENOUS at 10:30

## 2018-05-04 RX ADMIN — ACETAMINOPHEN 650 MG: 325 TABLET ORAL at 08:15

## 2018-05-04 RX ADMIN — Medication 2 MILLI-UNITS/MIN: at 07:59

## 2018-05-04 RX ADMIN — ROPIVACAINE HYDROCHLORIDE 14 ML/HR: 2 INJECTION, SOLUTION EPIDURAL; INFILTRATION at 09:45

## 2018-05-04 RX ADMIN — IBUPROFEN 800 MG: 800 TABLET ORAL at 21:51

## 2018-05-04 RX ADMIN — SODIUM CHLORIDE, POTASSIUM CHLORIDE, SODIUM LACTATE AND CALCIUM CHLORIDE 125 ML/HR: 600; 310; 30; 20 INJECTION, SOLUTION INTRAVENOUS at 08:00

## 2018-05-04 RX ADMIN — WITCH HAZEL 1 PAD: 500 SOLUTION RECTAL; TOPICAL at 19:00

## 2018-05-04 RX ADMIN — EPHEDRINE SULFATE 10 MG: 50 INJECTION INTRAVENOUS at 11:02

## 2018-05-04 RX ADMIN — MISOPROSTOL 600 MCG: 100 TABLET ORAL at 14:10

## 2018-05-04 RX ADMIN — SODIUM CHLORIDE, POTASSIUM CHLORIDE, SODIUM LACTATE AND CALCIUM CHLORIDE 125 ML/HR: 600; 310; 30; 20 INJECTION, SOLUTION INTRAVENOUS at 11:44

## 2018-05-04 RX ADMIN — BENZOCAINE 1 APPLICATION: 5.6 OINTMENT TOPICAL at 19:00

## 2018-05-04 RX ADMIN — MISOPROSTOL 25 MCG: 100 TABLET ORAL at 00:00

## 2018-05-04 NOTE — ANESTHESIA PREPROCEDURE EVALUATION
Anesthesia Evaluation     Patient summary reviewed and Nursing notes reviewed   no history of anesthetic complications:  NPO Solid Status: > 8 hours  NPO Liquid Status: > 8 hours           Airway   Mallampati: II  TM distance: >3 FB  Neck ROM: full  no difficulty expected  Dental - normal exam     Pulmonary - negative pulmonary ROS and normal exam   (-) not a smoker  Cardiovascular - negative cardio ROS and normal exam  Exercise tolerance: good (4-7 METS)    NYHA Classification: II        Neuro/Psych- negative ROS  GI/Hepatic/Renal/Endo    (+) obesity,  GERD,      Musculoskeletal (-) negative ROS    Abdominal  - normal exam    Bowel sounds: normal.   Substance History - negative use     OB/GYN    (+) Pregnant,         Other - negative ROS                       Anesthesia Plan    ASA 2     epidural     Anesthetic plan and risks discussed with patient.  Use of blood products discussed with patient  Consented to blood products.

## 2018-05-04 NOTE — PLAN OF CARE
Problem: Labor (Cervical Ripen, Induct, Augment) (Adult,Obstetrics,Pediatric)  Intervention: Prevent/Manage Maternal Infection   18   Safety Management   Infection Prevention personal protective equipment utilized;rest/sleep promoted;single patient room provided     Intervention: Monitor/Manage Labor Dystocia   18   Nutrition Interventions   Fluid/Electrolyte Management intravenous fluid replacement initiated     Intervention: Monitor/Manage Labor Pain   18   Promote Oxygenation/Perfusion   Pain Management Interventions pain management plan reviewed with patient/caregiver;no interventions per patient request;quiet environment facilitated     Intervention: Provide Emotional Support and Encouragement   18   Interventions   Trust Relationship/Rapport care explained;choices provided;questions answered;questions encouraged;reassurance provided;thoughts/feelings acknowledged     Intervention: Monitor/Manage Maternal Hemodynamic Stability   18   Reproductive Interventions    Bleed Management Rh status confirmed

## 2018-05-04 NOTE — ANESTHESIA PROCEDURE NOTES
Labor Epidural    Patient location during procedure: OB  Start Time: 5/4/2018 9:43 AM  Stop Time: 5/4/2018 9:44 AM  Indication:at surgeon's request  Performed By  Anesthesiologist: EL ALONZO  Preanesthetic Checklist  Completed: patient identified, site marked, surgical consent, pre-op evaluation, timeout performed, IV checked, risks and benefits discussed and monitors and equipment checked  Prep:  Pt Position:sitting  Sterile Tech:gloves, mask, sterile barrier and cap  Prep:povidone-iodine 7.5% surgical scrub  Monitoring:blood pressure monitoring  Epidural Block Procedure:  Approach:midline  Guidance:landmark technique and palpation technique  Location:L3-L4  Needle Type:Tuohy  Needle Gauge:17 G  Loss of Resistance Medium: saline  Loss of Resistance: 6cm  Cath Depth at skin:13 cm  Paresthesia: none  Aspiration:negative  Test Dose:negative  Number of Attempts: 1  Post Assessment:  Dressing:occlusive dressing applied and secured with tape  Pt Tolerance:patient tolerated the procedure well with no apparent complications  Complications:no

## 2018-05-04 NOTE — NON STRESS TEST
Rachel Escobar, a  at 38w6d with an CONCEPCION of 2018, by Other Basis, was seen at [facility] LABOR DELIVERY for a nonstress test.    Chief Complaint   Patient presents with   • Scheduled Induction     term cytotec induction. denies lof/bleeding. baby is active. denies pain.       Interpretation A  Nonstress Test Interpretation A: Reactive (18 : Dorie Cody RN)  Comments A: verified by GRIFFIN Celaya RN (18 : Dorie Cody RN)  11:26 PM

## 2018-05-04 NOTE — L&D DELIVERY NOTE
Josh  Vaginal Delivery Note    Delivery     Delivery: Vaginal, Spontaneous Delivery     YOB: 2016    Time of Birth: 2:00 PM      Anesthesia: Epidural     Delivering clinician: Marlon Marquez    Forceps?   No   Vacuum? No    Shoulder dystocia present: No        Delivery narrative:      Infant    Findings: male  infant     Infant observations: Weight: No birth weight on file.   Length:    in  Observations/Comments:         Apgars:    @ 1 minute /       @ 5 minutes   Infant Name:      Placenta, Cord, and Fluid    Placenta delivered     at        Cord:    present.   Nuchal Cord?  no   Cord blood obtained: Yes                   Repair    Episiotomy: Not recorded    Lacerations: No   Estimated Blood Loss:    mls.   Suture used for repair:      Complications  none    Disposition  Mother to postpartum in stable condition.    Marlon Marquez DO  05/04/18  2:55 PM

## 2018-05-05 LAB
BASOPHILS # BLD AUTO: 0.02 10*3/MM3 (ref 0–0.3)
BASOPHILS NFR BLD AUTO: 0.2 % (ref 0–2)
DEPRECATED RDW RBC AUTO: 44.2 FL (ref 37–54)
EOSINOPHIL # BLD AUTO: 0.11 10*3/MM3 (ref 0–0.7)
EOSINOPHIL NFR BLD AUTO: 0.9 % (ref 0–5)
ERYTHROCYTE [DISTWIDTH] IN BLOOD BY AUTOMATED COUNT: 15.5 % (ref 11.5–14.5)
HCT VFR BLD AUTO: 32 % (ref 37–47)
HGB BLD-MCNC: 10 G/DL (ref 12–16)
IMM GRANULOCYTES # BLD: 0.05 10*3/MM3 (ref 0–0.03)
IMM GRANULOCYTES NFR BLD: 0.4 % (ref 0–0.5)
LYMPHOCYTES # BLD AUTO: 2.38 10*3/MM3 (ref 1–3)
LYMPHOCYTES NFR BLD AUTO: 19.4 % (ref 21–51)
MCH RBC QN AUTO: 24.6 PG (ref 27–33)
MCHC RBC AUTO-ENTMCNC: 31.3 G/DL (ref 33–37)
MCV RBC AUTO: 78.6 FL (ref 80–94)
MONOCYTES # BLD AUTO: 1.39 10*3/MM3 (ref 0.1–0.9)
MONOCYTES NFR BLD AUTO: 11.3 % (ref 0–10)
NEUTROPHILS # BLD AUTO: 8.3 10*3/MM3 (ref 1.4–6.5)
NEUTROPHILS NFR BLD AUTO: 67.8 % (ref 30–70)
PLATELET # BLD AUTO: 190 10*3/MM3 (ref 130–400)
PMV BLD AUTO: 11 FL (ref 6–10)
RBC # BLD AUTO: 4.07 10*6/MM3 (ref 4.2–5.4)
WBC NRBC COR # BLD: 12.25 10*3/MM3 (ref 4.5–12.5)

## 2018-05-05 PROCEDURE — 85025 COMPLETE CBC W/AUTO DIFF WBC: CPT | Performed by: OBSTETRICS & GYNECOLOGY

## 2018-05-05 RX ADMIN — BENZOCAINE 1 APPLICATION: 5.6 OINTMENT TOPICAL at 12:10

## 2018-05-05 RX ADMIN — IBUPROFEN 800 MG: 800 TABLET ORAL at 22:00

## 2018-05-05 RX ADMIN — IBUPROFEN 800 MG: 800 TABLET ORAL at 06:00

## 2018-05-05 RX ADMIN — WITCH HAZEL 1 PAD: 500 SOLUTION RECTAL; TOPICAL at 12:10

## 2018-05-05 RX ADMIN — IBUPROFEN 800 MG: 800 TABLET ORAL at 15:00

## 2018-05-05 RX ADMIN — HYDROCORTISONE 2.5% 1 APPLICATION: 25 CREAM TOPICAL at 12:10

## 2018-05-05 RX ADMIN — PRENATAL VIT W/ FE FUMARATE-FA TAB 27-0.8 MG 1 TABLET: 27-0.8 TAB at 12:04

## 2018-05-05 RX ADMIN — DOCUSATE SODIUM 100 MG: 100 CAPSULE, LIQUID FILLED ORAL at 12:04

## 2018-05-05 NOTE — PROGRESS NOTES
" Josh  Vaginal Delivery Progress Note    Subjective   Postpartum Day 1: Vaginal Delivery    The patient feels well.  Denies complaints.  Lochia is light.      Objective     Vital Signs Range for the last 24 hours  Temperature: Temp:  [98.1 °F (36.7 °C)-98.5 °F (36.9 °C)] 98.2 °F (36.8 °C)   Temp Source: Temp src: Oral   BP: BP: ()/(46-67) 114/63   Pulse: Heart Rate:  [] 86   Respirations: Resp:  [16-20] 16   SPO2: SpO2:  [98 %] 98 %   O2 Amount (l/min):     O2 Devices Device (Oxygen Therapy): room air   Weight:       Admit Height:  Height: 154.9 cm (61\")      Physical Exam:  General:  no acute distresss.  Abdomen: Fundus: appropriate, firm, non tender  Extremities: normal, atraumatic, no cyanosis, and trace edema.       Lab results reviewed:  Yes       Assessment/Plan     Normal postpartum state      Plan:  Continue current care.      Marlon Marquez DO  5/5/2018  11:25 AM  "

## 2018-05-05 NOTE — PLAN OF CARE
Problem: Patient Care Overview  Goal: Plan of Care Review  Outcome: Ongoing (interventions implemented as appropriate)   05/05/18 2011   Coping/Psychosocial   Plan of Care Reviewed With patient   Plan of Care Review   Progress improving

## 2018-05-05 NOTE — PLAN OF CARE
Problem: Patient Care Overview  Goal: Plan of Care Review  Outcome: Ongoing (interventions implemented as appropriate)   05/05/18 0245   Coping/Psychosocial   Plan of Care Reviewed With patient;mother   Plan of Care Review   Progress improving   OTHER   Outcome Summary Pt voiding well and ambualting without difficulty.     Goal: Individualization and Mutuality  Outcome: Ongoing (interventions implemented as appropriate)    Goal: Discharge Needs Assessment  Outcome: Ongoing (interventions implemented as appropriate)      Problem: Postpartum (Vaginal Delivery) (Adult,Obstetrics,Pediatric)  Goal: Signs and Symptoms of Listed Potential Problems Will be Absent, Minimized or Managed (Postpartum)  Outcome: Ongoing (interventions implemented as appropriate)

## 2018-05-06 VITALS
DIASTOLIC BLOOD PRESSURE: 67 MMHG | BODY MASS INDEX: 36.44 KG/M2 | RESPIRATION RATE: 16 BRPM | SYSTOLIC BLOOD PRESSURE: 106 MMHG | HEIGHT: 61 IN | HEART RATE: 93 BPM | WEIGHT: 193 LBS | OXYGEN SATURATION: 98 % | TEMPERATURE: 98 F

## 2018-05-06 RX ORDER — SODIUM CHLORIDE 9 MG/ML
INJECTION, SOLUTION INTRAVENOUS
Status: DISCONTINUED
Start: 2018-05-06 | End: 2018-05-06 | Stop reason: HOSPADM

## 2018-05-06 RX ORDER — IBUPROFEN 600 MG/1
600 TABLET ORAL EVERY 6 HOURS PRN
Qty: 30 TABLET | Refills: 0 | Status: SHIPPED | OUTPATIENT
Start: 2018-05-06 | End: 2018-06-05

## 2018-05-06 RX ADMIN — PRENATAL VIT W/ FE FUMARATE-FA TAB 27-0.8 MG 1 TABLET: 27-0.8 TAB at 08:47

## 2018-05-06 RX ADMIN — DOCUSATE SODIUM 100 MG: 100 CAPSULE, LIQUID FILLED ORAL at 08:47

## 2018-05-06 RX ADMIN — IBUPROFEN 800 MG: 800 TABLET ORAL at 06:24

## 2018-05-06 NOTE — PLAN OF CARE
Problem: Patient Care Overview  Goal: Plan of Care Review  Outcome: Ongoing (interventions implemented as appropriate)   05/05/18 0245 05/06/18 0656   Coping/Psychosocial   Plan of Care Reviewed With --  patient   Plan of Care Review   Progress --  improving   OTHER   Outcome Summary Pt voiding well and ambualting without difficulty. --      Goal: Individualization and Mutuality  Outcome: Ongoing (interventions implemented as appropriate)    Goal: Discharge Needs Assessment  Outcome: Ongoing (interventions implemented as appropriate)      Problem: Postpartum (Vaginal Delivery) (Adult,Obstetrics,Pediatric)  Goal: Signs and Symptoms of Listed Potential Problems Will be Absent, Minimized or Managed (Postpartum)  Outcome: Ongoing (interventions implemented as appropriate)

## 2018-05-06 NOTE — DISCHARGE SUMMARY
Josh  Delivery Discharge Summary    Primary OB Clinician: Marlon Marquez DO    EDC: Estimated Date of Delivery: 18    Gestational Age:39w0d    Antepartum complications: none    Date of Delivery: 2018   Time of Delivery: 2:00 PM     Delivered By:  Marlon Marquez     Delivery Type: Vaginal, Spontaneous Delivery      Tubal Ligation: n/a    Baby:@BABYNOHDR(SEX)@ infant;   Apgar:  9   @ 1 minute /   Apgar:  9   @ 5 minutes   Weight: @BABYNOHDR(BIRTHWEIGHT)@   Length: @BABYNOHDR(DELRECITE,B,26710,,,,)@    Anesthesia: Epidural      Intrapartum complications: None    Laceration: No    Episiotomy: No    Placenta: Spontaneous     Feeding method: Breastfeeding Status: Unknown    Discharge Date: 2018; Discharge Time: 10:18 AM    Plan:    Follow-up appointment with primary OB/GYN in 2 weeks.

## 2018-05-06 NOTE — PROGRESS NOTES
" Josh  Vaginal Delivery Progress Note    Subjective   Postpartum Day 2: Vaginal Delivery    The patient feels well.  Denies complaints.  Lochia is light.      Objective     Vital Signs Range for the last 24 hours  Temperature: Temp:  [97.7 °F (36.5 °C)-98 °F (36.7 °C)] 98 °F (36.7 °C)   Temp Source: Temp src: Oral   BP: BP: ()/(61-67) 106/67   Pulse: Heart Rate:  [80-93] 93   Respirations: Resp:  [16] 16   SPO2:     O2 Amount (l/min):     O2 Devices     Weight:       Admit Height:  Height: 154.9 cm (61\")      Physical Exam:  General:  no acute distresss.  Abdomen: Fundus: appropriate, firm, non tender  Extremities: normal, atraumatic, no cyanosis, and trace edema.       Lab results reviewed:  Yes       Assessment/Plan     Normal postpartum state      Plan:  Discharge home with standard precautions and return to clinic in 4-6 weeks.      Marlon Marquez DO  5/6/2018  10:17 AM  "

## 2019-09-20 LAB
EXTERNAL HEPATITIS B SURFACE ANTIGEN: NEGATIVE
EXTERNAL RUBELLA QUALITATIVE: NORMAL
EXTERNAL SYPHILIS RPR SCREEN: NORMAL
HIV1 P24 AG SERPL QL IA: NORMAL

## 2020-02-03 LAB — EXTERNAL GTT 1 HOUR: 147

## 2020-02-07 LAB — EXTERNAL GTT 3 HOURS: 117

## 2020-04-16 ENCOUNTER — HOSPITAL ENCOUNTER (OUTPATIENT)
Dept: LABOR AND DELIVERY | Facility: HOSPITAL | Age: 30
Discharge: HOME | End: 2020-04-16

## 2020-04-16 ENCOUNTER — HOSPITAL ENCOUNTER (INPATIENT)
Facility: HOSPITAL | Age: 30
LOS: 3 days | Discharge: HOME | End: 2020-04-19
Attending: OBSTETRICS & GYNECOLOGY | Admitting: OBSTETRICS & GYNECOLOGY

## 2020-04-16 DIAGNOSIS — Z3A.39 39 WEEKS GESTATION OF PREGNANCY: ICD-10-CM

## 2020-04-16 PROBLEM — Z34.90 TERM PREGNANCY: Status: ACTIVE | Noted: 2020-04-16

## 2020-04-16 LAB
ABO GROUP BLD: NORMAL
BASOPHILS # BLD AUTO: 0.03 10*3/MM3 (ref 0–0.2)
BASOPHILS NFR BLD AUTO: 0.2 % (ref 0–1.5)
BLD GP AB SCN SERPL QL: NEGATIVE
DEPRECATED RDW RBC AUTO: 48 FL (ref 37–54)
EOSINOPHIL # BLD AUTO: 0.14 10*3/MM3 (ref 0–0.4)
EOSINOPHIL NFR BLD AUTO: 1.1 % (ref 0.3–6.2)
ERYTHROCYTE [DISTWIDTH] IN BLOOD BY AUTOMATED COUNT: 14.4 % (ref 12.3–15.4)
HCT VFR BLD AUTO: 39 % (ref 34–46.6)
HGB BLD-MCNC: 12.5 G/DL (ref 12–15.9)
IMM GRANULOCYTES # BLD AUTO: 0.11 10*3/MM3 (ref 0–0.05)
IMM GRANULOCYTES NFR BLD AUTO: 0.9 % (ref 0–0.5)
LYMPHOCYTES # BLD AUTO: 3.49 10*3/MM3 (ref 0.7–3.1)
LYMPHOCYTES NFR BLD AUTO: 27.8 % (ref 19.6–45.3)
MCH RBC QN AUTO: 29.6 PG (ref 26.6–33)
MCHC RBC AUTO-ENTMCNC: 32.1 G/DL (ref 31.5–35.7)
MCV RBC AUTO: 92.2 FL (ref 79–97)
MONOCYTES # BLD AUTO: 1.07 10*3/MM3 (ref 0.1–0.9)
MONOCYTES NFR BLD AUTO: 8.5 % (ref 5–12)
NEUTROPHILS # BLD AUTO: 7.71 10*3/MM3 (ref 1.7–7)
NEUTROPHILS NFR BLD AUTO: 61.5 % (ref 42.7–76)
NRBC BLD AUTO-RTO: 0 /100 WBC (ref 0–0.2)
PLATELET # BLD AUTO: 167 10*3/MM3 (ref 140–450)
PMV BLD AUTO: 11 FL (ref 6–12)
RBC # BLD AUTO: 4.23 10*6/MM3 (ref 3.77–5.28)
RH BLD: POSITIVE
T&S EXPIRATION DATE: NORMAL
WBC NRBC COR # BLD: 12.55 10*3/MM3 (ref 3.4–10.8)

## 2020-04-16 PROCEDURE — 86850 RBC ANTIBODY SCREEN: CPT | Performed by: OBSTETRICS & GYNECOLOGY

## 2020-04-16 PROCEDURE — 86901 BLOOD TYPING SEROLOGIC RH(D): CPT | Performed by: OBSTETRICS & GYNECOLOGY

## 2020-04-16 PROCEDURE — 86900 BLOOD TYPING SEROLOGIC ABO: CPT | Performed by: OBSTETRICS & GYNECOLOGY

## 2020-04-16 PROCEDURE — 59025 FETAL NON-STRESS TEST: CPT

## 2020-04-16 PROCEDURE — 85025 COMPLETE CBC W/AUTO DIFF WBC: CPT | Performed by: OBSTETRICS & GYNECOLOGY

## 2020-04-16 RX ORDER — TERBUTALINE SULFATE 1 MG/ML
0.25 INJECTION, SOLUTION SUBCUTANEOUS AS NEEDED
Status: DISCONTINUED | OUTPATIENT
Start: 2020-04-16 | End: 2020-04-17 | Stop reason: HOSPADM

## 2020-04-16 RX ORDER — ACETAMINOPHEN 325 MG/1
650 TABLET ORAL EVERY 4 HOURS PRN
Status: DISCONTINUED | OUTPATIENT
Start: 2020-04-16 | End: 2020-04-17 | Stop reason: HOSPADM

## 2020-04-16 RX ORDER — OXYTOCIN-SODIUM CHLORIDE 0.9% IV SOLN 30 UNIT/500ML 30-0.9/5 UT/ML-%
2-20 SOLUTION INTRAVENOUS
Status: DISCONTINUED | OUTPATIENT
Start: 2020-04-17 | End: 2020-04-17 | Stop reason: HOSPADM

## 2020-04-16 RX ORDER — ONDANSETRON 2 MG/ML
4 INJECTION INTRAMUSCULAR; INTRAVENOUS EVERY 6 HOURS PRN
Status: DISCONTINUED | OUTPATIENT
Start: 2020-04-16 | End: 2020-04-17 | Stop reason: HOSPADM

## 2020-04-16 RX ORDER — PRENATAL VIT/IRON FUM/FOLIC AC 27MG-0.8MG
1 TABLET ORAL NIGHTLY
Status: DISCONTINUED | OUTPATIENT
Start: 2020-04-17 | End: 2020-04-17

## 2020-04-16 RX ORDER — HYDROXYZINE 50 MG/1
50 TABLET, FILM COATED ORAL NIGHTLY PRN
Status: DISCONTINUED | OUTPATIENT
Start: 2020-04-16 | End: 2020-04-17

## 2020-04-16 RX ORDER — MISOPROSTOL 100 UG/1
25 TABLET ORAL
Status: COMPLETED | OUTPATIENT
Start: 2020-04-16 | End: 2020-04-17

## 2020-04-16 RX ORDER — BUTORPHANOL TARTRATE 1 MG/ML
1 INJECTION, SOLUTION INTRAMUSCULAR; INTRAVENOUS
Status: DISCONTINUED | OUTPATIENT
Start: 2020-04-16 | End: 2020-04-17 | Stop reason: HOSPADM

## 2020-04-16 RX ORDER — ONDANSETRON 4 MG/1
4 TABLET, FILM COATED ORAL EVERY 6 HOURS PRN
Status: DISCONTINUED | OUTPATIENT
Start: 2020-04-16 | End: 2020-04-17 | Stop reason: HOSPADM

## 2020-04-16 RX ORDER — SODIUM CHLORIDE 0.9 % (FLUSH) 0.9 %
3-10 SYRINGE (ML) INJECTION AS NEEDED
Status: DISCONTINUED | OUTPATIENT
Start: 2020-04-16 | End: 2020-04-17 | Stop reason: HOSPADM

## 2020-04-16 RX ORDER — MAGNESIUM HYDROXIDE 1200 MG/15ML
1000 LIQUID ORAL ONCE AS NEEDED
Status: DISCONTINUED | OUTPATIENT
Start: 2020-04-16 | End: 2020-04-17 | Stop reason: HOSPADM

## 2020-04-16 RX ORDER — SODIUM CHLORIDE, SODIUM LACTATE, POTASSIUM CHLORIDE, CALCIUM CHLORIDE 600; 310; 30; 20 MG/100ML; MG/100ML; MG/100ML; MG/100ML
125 INJECTION, SOLUTION INTRAVENOUS CONTINUOUS
Status: DISCONTINUED | OUTPATIENT
Start: 2020-04-16 | End: 2020-04-17

## 2020-04-16 RX ADMIN — HYDROXYZINE HYDROCHLORIDE 50 MG: 50 TABLET ORAL at 23:48

## 2020-04-16 RX ADMIN — MISOPROSTOL 25 MCG: 100 TABLET ORAL at 22:06

## 2020-04-16 RX ADMIN — SODIUM CHLORIDE, POTASSIUM CHLORIDE, SODIUM LACTATE AND CALCIUM CHLORIDE 125 ML/HR: 600; 310; 30; 20 INJECTION, SOLUTION INTRAVENOUS at 22:05

## 2020-04-17 ENCOUNTER — ANESTHESIA EVENT (OUTPATIENT)
Dept: LABOR AND DELIVERY | Facility: HOSPITAL | Age: 30
End: 2020-04-17

## 2020-04-17 ENCOUNTER — ANESTHESIA (OUTPATIENT)
Dept: LABOR AND DELIVERY | Facility: HOSPITAL | Age: 30
End: 2020-04-17

## 2020-04-17 PROCEDURE — C1755 CATHETER, INTRASPINAL: HCPCS

## 2020-04-17 PROCEDURE — C1755 CATHETER, INTRASPINAL: HCPCS | Performed by: ANESTHESIOLOGY

## 2020-04-17 PROCEDURE — 25010000002 BUTORPHANOL PER 1 MG: Performed by: OBSTETRICS & GYNECOLOGY

## 2020-04-17 PROCEDURE — 25010000002 ONDANSETRON PER 1 MG: Performed by: OBSTETRICS & GYNECOLOGY

## 2020-04-17 RX ORDER — FAMOTIDINE 10 MG/ML
20 INJECTION, SOLUTION INTRAVENOUS ONCE AS NEEDED
Status: DISCONTINUED | OUTPATIENT
Start: 2020-04-17 | End: 2020-04-17 | Stop reason: HOSPADM

## 2020-04-17 RX ORDER — DOCUSATE SODIUM 100 MG/1
100 CAPSULE, LIQUID FILLED ORAL DAILY
Status: DISCONTINUED | OUTPATIENT
Start: 2020-04-17 | End: 2020-04-20 | Stop reason: HOSPADM

## 2020-04-17 RX ORDER — ROPIVACAINE HYDROCHLORIDE 2 MG/ML
INJECTION, SOLUTION EPIDURAL; INFILTRATION; PERINEURAL
Status: DISCONTINUED
Start: 2020-04-17 | End: 2020-04-20 | Stop reason: HOSPADM

## 2020-04-17 RX ORDER — ACETAMINOPHEN 325 MG/1
650 TABLET ORAL EVERY 4 HOURS PRN
Status: DISCONTINUED | OUTPATIENT
Start: 2020-04-17 | End: 2020-04-17 | Stop reason: HOSPADM

## 2020-04-17 RX ORDER — OXYTOCIN-SODIUM CHLORIDE 0.9% IV SOLN 30 UNIT/500ML 30-0.9/5 UT/ML-%
999 SOLUTION INTRAVENOUS ONCE
Status: DISCONTINUED | OUTPATIENT
Start: 2020-04-17 | End: 2020-04-20 | Stop reason: HOSPADM

## 2020-04-17 RX ORDER — ONDANSETRON 2 MG/ML
4 INJECTION INTRAMUSCULAR; INTRAVENOUS ONCE AS NEEDED
Status: DISCONTINUED | OUTPATIENT
Start: 2020-04-17 | End: 2020-04-17 | Stop reason: HOSPADM

## 2020-04-17 RX ORDER — METHYLERGONOVINE MALEATE 0.2 MG/ML
200 INJECTION INTRAVENOUS ONCE AS NEEDED
Status: DISCONTINUED | OUTPATIENT
Start: 2020-04-17 | End: 2020-04-17 | Stop reason: HOSPADM

## 2020-04-17 RX ORDER — ONDANSETRON 2 MG/ML
4 INJECTION INTRAMUSCULAR; INTRAVENOUS EVERY 6 HOURS PRN
Status: DISCONTINUED | OUTPATIENT
Start: 2020-04-17 | End: 2020-04-20 | Stop reason: HOSPADM

## 2020-04-17 RX ORDER — IBUPROFEN 800 MG/1
800 TABLET ORAL EVERY 8 HOURS SCHEDULED
Status: DISCONTINUED | OUTPATIENT
Start: 2020-04-17 | End: 2020-04-20 | Stop reason: HOSPADM

## 2020-04-17 RX ORDER — BUPIVACAINE HYDROCHLORIDE 2.5 MG/ML
INJECTION, SOLUTION EPIDURAL; INFILTRATION; INTRACAUDAL AS NEEDED
Status: DISCONTINUED | OUTPATIENT
Start: 2020-04-17 | End: 2020-04-17 | Stop reason: SURG

## 2020-04-17 RX ORDER — EPHEDRINE SULFATE 50 MG/ML
10 INJECTION, SOLUTION INTRAVENOUS
Status: DISCONTINUED | OUTPATIENT
Start: 2020-04-17 | End: 2020-04-17 | Stop reason: HOSPADM

## 2020-04-17 RX ORDER — OXYTOCIN-SODIUM CHLORIDE 0.9% IV SOLN 30 UNIT/500ML 30-0.9/5 UT/ML-%
125 SOLUTION INTRAVENOUS CONTINUOUS PRN
Status: DISCONTINUED | OUTPATIENT
Start: 2020-04-17 | End: 2020-04-20 | Stop reason: HOSPADM

## 2020-04-17 RX ORDER — IBUPROFEN 800 MG/1
800 TABLET ORAL EVERY 8 HOURS SCHEDULED
Status: DISCONTINUED | OUTPATIENT
Start: 2020-04-17 | End: 2020-04-17 | Stop reason: HOSPADM

## 2020-04-17 RX ORDER — METOCLOPRAMIDE 10 MG/1
10 TABLET ORAL ONCE
Status: COMPLETED | OUTPATIENT
Start: 2020-04-17 | End: 2020-04-17

## 2020-04-17 RX ORDER — BISACODYL 10 MG
10 SUPPOSITORY, RECTAL RECTAL DAILY PRN
Status: DISCONTINUED | OUTPATIENT
Start: 2020-04-18 | End: 2020-04-20 | Stop reason: HOSPADM

## 2020-04-17 RX ORDER — PRENATAL VIT/IRON FUM/FOLIC AC 27MG-0.8MG
1 TABLET ORAL DAILY
Status: DISCONTINUED | OUTPATIENT
Start: 2020-04-17 | End: 2020-04-20 | Stop reason: HOSPADM

## 2020-04-17 RX ORDER — HYDROCODONE BITARTRATE AND ACETAMINOPHEN 5; 325 MG/1; MG/1
1 TABLET ORAL EVERY 4 HOURS PRN
Status: DISCONTINUED | OUTPATIENT
Start: 2020-04-17 | End: 2020-04-20 | Stop reason: HOSPADM

## 2020-04-17 RX ORDER — MISOPROSTOL 100 UG/1
600 TABLET ORAL ONCE AS NEEDED
Status: DISCONTINUED | OUTPATIENT
Start: 2020-04-17 | End: 2020-04-20 | Stop reason: HOSPADM

## 2020-04-17 RX ORDER — BUPIVACAINE HYDROCHLORIDE 2.5 MG/ML
INJECTION, SOLUTION EPIDURAL; INFILTRATION; INTRACAUDAL
Status: COMPLETED
Start: 2020-04-17 | End: 2020-04-17

## 2020-04-17 RX ORDER — SODIUM CHLORIDE 0.9 % (FLUSH) 0.9 %
1-10 SYRINGE (ML) INJECTION AS NEEDED
Status: DISCONTINUED | OUTPATIENT
Start: 2020-04-17 | End: 2020-04-20 | Stop reason: HOSPADM

## 2020-04-17 RX ORDER — METHYLERGONOVINE MALEATE 0.2 MG/ML
200 INJECTION INTRAVENOUS ONCE AS NEEDED
Status: DISCONTINUED | OUTPATIENT
Start: 2020-04-17 | End: 2020-04-20 | Stop reason: HOSPADM

## 2020-04-17 RX ORDER — MISOPROSTOL 100 UG/1
600 TABLET ORAL AS NEEDED
Status: DISCONTINUED | OUTPATIENT
Start: 2020-04-17 | End: 2020-04-17 | Stop reason: HOSPADM

## 2020-04-17 RX ORDER — HYDROCODONE BITARTRATE AND ACETAMINOPHEN 7.5; 325 MG/1; MG/1
1 TABLET ORAL EVERY 4 HOURS PRN
Status: DISCONTINUED | OUTPATIENT
Start: 2020-04-17 | End: 2020-04-17 | Stop reason: HOSPADM

## 2020-04-17 RX ORDER — CARBOPROST TROMETHAMINE 250 UG/ML
250 INJECTION, SOLUTION INTRAMUSCULAR ONCE AS NEEDED
Status: DISCONTINUED | OUTPATIENT
Start: 2020-04-17 | End: 2020-04-20 | Stop reason: HOSPADM

## 2020-04-17 RX ORDER — LANOLIN 100 %
OINTMENT (GRAM) TOPICAL
Status: DISCONTINUED | OUTPATIENT
Start: 2020-04-17 | End: 2020-04-20 | Stop reason: HOSPADM

## 2020-04-17 RX ORDER — DIPHENHYDRAMINE HCL 25 MG
25 CAPSULE ORAL NIGHTLY PRN
Status: DISCONTINUED | OUTPATIENT
Start: 2020-04-17 | End: 2020-04-20 | Stop reason: HOSPADM

## 2020-04-17 RX ORDER — ROPIVACAINE HYDROCHLORIDE 2 MG/ML
14 INJECTION, SOLUTION EPIDURAL; INFILTRATION; PERINEURAL CONTINUOUS
Status: DISCONTINUED | OUTPATIENT
Start: 2020-04-17 | End: 2020-04-17

## 2020-04-17 RX ORDER — OXYTOCIN-SODIUM CHLORIDE 0.9% IV SOLN 30 UNIT/500ML 30-0.9/5 UT/ML-%
250 SOLUTION INTRAVENOUS CONTINUOUS
Status: ACTIVE | OUTPATIENT
Start: 2020-04-17 | End: 2020-04-17

## 2020-04-17 RX ORDER — ONDANSETRON 4 MG/1
4 TABLET, FILM COATED ORAL EVERY 6 HOURS PRN
Status: DISCONTINUED | OUTPATIENT
Start: 2020-04-17 | End: 2020-04-20 | Stop reason: HOSPADM

## 2020-04-17 RX ORDER — CARBOPROST TROMETHAMINE 250 UG/ML
250 INJECTION, SOLUTION INTRAMUSCULAR AS NEEDED
Status: DISCONTINUED | OUTPATIENT
Start: 2020-04-17 | End: 2020-04-17 | Stop reason: HOSPADM

## 2020-04-17 RX ORDER — IBUPROFEN 800 MG/1
800 TABLET ORAL EVERY 8 HOURS SCHEDULED
Status: DISCONTINUED | OUTPATIENT
Start: 2020-04-17 | End: 2020-04-17

## 2020-04-17 RX ADMIN — WITCH HAZEL 1 PAD: 500 SOLUTION RECTAL; TOPICAL at 17:03

## 2020-04-17 RX ADMIN — SODIUM CHLORIDE, POTASSIUM CHLORIDE, SODIUM LACTATE AND CALCIUM CHLORIDE 1000 ML: 600; 310; 30; 20 INJECTION, SOLUTION INTRAVENOUS at 11:38

## 2020-04-17 RX ADMIN — MISOPROSTOL 600 MCG: 100 TABLET ORAL at 13:56

## 2020-04-17 RX ADMIN — OXYTOCIN 2 MILLI-UNITS/MIN: 10 INJECTION, SOLUTION INTRAMUSCULAR; INTRAVENOUS at 08:33

## 2020-04-17 RX ADMIN — HYDROCODONE BITARTRATE AND ACETAMINOPHEN 1 TABLET: 5; 325 TABLET ORAL at 19:27

## 2020-04-17 RX ADMIN — METOCLOPRAMIDE 10 MG: 10 TABLET ORAL at 17:02

## 2020-04-17 RX ADMIN — PRENATAL VIT W/ FE FUMARATE-FA TAB 27-0.8 MG 1 TABLET: 27-0.8 TAB at 17:02

## 2020-04-17 RX ADMIN — ONDANSETRON 4 MG: 2 INJECTION INTRAMUSCULAR; INTRAVENOUS at 02:45

## 2020-04-17 RX ADMIN — DOCUSATE SODIUM 100 MG: 100 CAPSULE ORAL at 17:02

## 2020-04-17 RX ADMIN — BUTORPHANOL TARTRATE 1 MG: 1 INJECTION, SOLUTION INTRAMUSCULAR; INTRAVENOUS at 02:45

## 2020-04-17 RX ADMIN — SODIUM CHLORIDE, POTASSIUM CHLORIDE, SODIUM LACTATE AND CALCIUM CHLORIDE 125 ML/HR: 600; 310; 30; 20 INJECTION, SOLUTION INTRAVENOUS at 04:44

## 2020-04-17 RX ADMIN — Medication: at 17:03

## 2020-04-17 RX ADMIN — BUPIVACAINE HYDROCHLORIDE 10 ML: 2.5 INJECTION, SOLUTION EPIDURAL; INFILTRATION; INTRACAUDAL; PERINEURAL at 12:02

## 2020-04-17 RX ADMIN — IBUPROFEN 800 MG: 800 TABLET, FILM COATED ORAL at 17:13

## 2020-04-17 RX ADMIN — MISOPROSTOL 25 MCG: 100 TABLET ORAL at 02:13

## 2020-04-17 NOTE — ANESTHESIA PROCEDURE NOTES
Labor Epidural    Pre-sedation assessment completed: 4/17/2020 11:50 AM    Patient location during procedure: OB  Start Time: 4/17/2020 11:50 AM  Stop Time: 4/17/2020 12:01 PM  Indication:at surgeon's request  Performed By  Anesthesiologist: Raz Stallings MD  Preanesthetic Checklist  Completed: patient identified, site marked, surgical consent, pre-op evaluation, timeout performed, IV checked, risks and benefits discussed and monitors and equipment checked  Prep:  Pt Position:sitting  Sterile Tech:gloves, mask, sterile barrier and cap  Prep:povidone-iodine 7.5% surgical scrub  Monitoring:blood pressure monitoring  Epidural Block Procedure:  Approach:midline  Guidance:landmark technique and palpation technique  Location:L3-L4  Needle Type:Tuohy  Needle Gauge:17 G  Loss of Resistance Medium: air  Loss of Resistance: 7cm  Cath Depth at skin:9 cm  Paresthesia: none  Aspiration:negative  Test Dose:negative  Number of Attempts: 1  Post Assessment:  Dressing:occlusive dressing applied and secured with tape  Pt Tolerance:patient tolerated the procedure well with no apparent complications  Complications:no

## 2020-04-17 NOTE — PLAN OF CARE
CERVICAL CHANGE AS NOTED. PT STATES SHE IS COPING WELL WITH PAIN AT PRESENT. DENIES NEEDS OR C/O'S.

## 2020-04-17 NOTE — L&D DELIVERY NOTE
Josh  Vaginal Delivery Note    Delivery     Delivery: Vaginal, Spontaneous     YOB: 2018    Time of Birth: 1:46 PM      Anesthesia: Epidural     Delivering clinician: Marlon Marquez    Forceps?   No   Vacuum? No    Shoulder dystocia present: No        Delivery narrative:      Infant    Findings: male  infant     Infant observations: Weight: 2984 g (6 lb 9.3 oz)   Length: 18.898  in  Observations/Comments:         Apgars: 8   @ 1 minute /    9   @ 5 minutes   Infant Name:      Placenta, Cord, and Fluid    Placenta delivered  Spontaneous  at  4/17/2020  1:50 PM     Cord: 3 vessels  present.   Nuchal Cord?  yes; Number of nuchal loops present:  2    Cord blood obtained: Yes                   Repair    Episiotomy: None    Lacerations: No   Estimated Blood Loss: 300  mls.   Suture used for repair:      Complications  none    Disposition  Mother to postpartum in stable condition.    Marlon Marquez DO  04/17/20  16:50

## 2020-04-17 NOTE — NON STRESS TEST
Rachel Escobar, a  at 39w1d with an CONCEPCION of 2020, by Last Menstrual Period, was seen at [facility] LABOR DELIVERY for a nonstress test.    Chief Complaint   Patient presents with   • Scheduled Induction     Presents ambulatory to L&D for scheduled Induction of labor due to term. Reports no bleeding, ROM, or contractions. Reports good fetal movement.        Patient Active Problem List   Diagnosis   • Sinus tachycardia   • Pregnancy   • 39 weeks gestation of pregnancy   • Term pregnancy       Start Time:   Stop Time:     Interpretation A  Nonstress Test Interpretation A: Reactive (20 : Saloni Parker RN)  Comments A: VERIFIED BY GRIFFIN FRYE RN.  (20 : Saloni Parker, HILDA)

## 2020-04-17 NOTE — ANESTHESIA PREPROCEDURE EVALUATION
Anesthesia Evaluation     no history of anesthetic complications:  NPO Solid Status: > 8 hours  NPO Liquid Status: > 8 hours           Airway   Mallampati: II  TM distance: >3 FB  Neck ROM: full  No difficulty expected  Dental - normal exam     Pulmonary - normal exam   Cardiovascular - normal exam        Neuro/Psych  GI/Hepatic/Renal/Endo      Musculoskeletal     Abdominal  - normal exam   Substance History      OB/GYN    (+) Pregnant,         Other                        Anesthesia Plan    ASA 2     epidural       Anesthetic plan, all risks, benefits, and alternatives have been provided, discussed and informed consent has been obtained with: patient.

## 2020-04-18 LAB
BASOPHILS # BLD AUTO: 0.03 10*3/MM3 (ref 0–0.2)
BASOPHILS NFR BLD AUTO: 0.2 % (ref 0–1.5)
DEPRECATED RDW RBC AUTO: 47.1 FL (ref 37–54)
EOSINOPHIL # BLD AUTO: 0.15 10*3/MM3 (ref 0–0.4)
EOSINOPHIL NFR BLD AUTO: 1.2 % (ref 0.3–6.2)
ERYTHROCYTE [DISTWIDTH] IN BLOOD BY AUTOMATED COUNT: 14.4 % (ref 12.3–15.4)
HCT VFR BLD AUTO: 35.4 % (ref 34–46.6)
HGB BLD-MCNC: 11.9 G/DL (ref 12–15.9)
IMM GRANULOCYTES # BLD AUTO: 0.07 10*3/MM3 (ref 0–0.05)
IMM GRANULOCYTES NFR BLD AUTO: 0.6 % (ref 0–0.5)
LYMPHOCYTES # BLD AUTO: 3.02 10*3/MM3 (ref 0.7–3.1)
LYMPHOCYTES NFR BLD AUTO: 24.1 % (ref 19.6–45.3)
MCH RBC QN AUTO: 30.5 PG (ref 26.6–33)
MCHC RBC AUTO-ENTMCNC: 33.6 G/DL (ref 31.5–35.7)
MCV RBC AUTO: 90.8 FL (ref 79–97)
MONOCYTES # BLD AUTO: 1.08 10*3/MM3 (ref 0.1–0.9)
MONOCYTES NFR BLD AUTO: 8.6 % (ref 5–12)
NEUTROPHILS # BLD AUTO: 8.19 10*3/MM3 (ref 1.7–7)
NEUTROPHILS NFR BLD AUTO: 65.3 % (ref 42.7–76)
NRBC BLD AUTO-RTO: 0 /100 WBC (ref 0–0.2)
PLATELET # BLD AUTO: 136 10*3/MM3 (ref 140–450)
PMV BLD AUTO: 11.1 FL (ref 6–12)
RBC # BLD AUTO: 3.9 10*6/MM3 (ref 3.77–5.28)
WBC NRBC COR # BLD: 12.54 10*3/MM3 (ref 3.4–10.8)

## 2020-04-18 PROCEDURE — 85025 COMPLETE CBC W/AUTO DIFF WBC: CPT | Performed by: OBSTETRICS & GYNECOLOGY

## 2020-04-18 RX ADMIN — IBUPROFEN 800 MG: 800 TABLET, FILM COATED ORAL at 21:55

## 2020-04-18 RX ADMIN — IBUPROFEN 800 MG: 800 TABLET, FILM COATED ORAL at 13:12

## 2020-04-18 RX ADMIN — DOCUSATE SODIUM 100 MG: 100 CAPSULE ORAL at 08:21

## 2020-04-18 RX ADMIN — PRENATAL VIT W/ FE FUMARATE-FA TAB 27-0.8 MG 1 TABLET: 27-0.8 TAB at 08:21

## 2020-04-18 RX ADMIN — IBUPROFEN 800 MG: 800 TABLET, FILM COATED ORAL at 05:56

## 2020-04-18 NOTE — PLAN OF CARE
Problem: Patient Care Overview  Goal: Plan of Care Review  Outcome: Ongoing (interventions implemented as appropriate)  Flowsheets (Taken 4/18/2020 6601)  Progress: improving  Plan of Care Reviewed With: patient; spouse  Outcome Summary: Pt doing well. Bleeding wnl, voiding spontaneously without difficulty. Breastfeeding and bonding with infant appropriately. Pain managed with motrin 800mg as scheduled.  No needs at this time.     Problem: Breastfeeding (Adult,Obstetrics,Pediatric)  Goal: Signs and Symptoms of Listed Potential Problems Will be Absent, Minimized or Managed (Breastfeeding)  Outcome: Ongoing (interventions implemented as appropriate)     Problem: Postpartum (Vaginal Delivery) (Adult,Obstetrics,Pediatric)  Goal: Signs and Symptoms of Listed Potential Problems Will be Absent, Minimized or Managed (Postpartum)  Outcome: Ongoing (interventions implemented as appropriate)

## 2020-04-18 NOTE — PROGRESS NOTES
" Josh  Vaginal Delivery Progress Note    Subjective   Subjective  Postpartum Day 1: Vaginal Delivery    The patient feels well.  Her pain is well controlled with prescribed pain medications.   She is ambulating well.  Patient describes her bleeding as thin lochia.        Objective     Objective   Vital Signs Range for the last 24 hours  Temperature: Temp:  [97 °F (36.1 °C)-98.4 °F (36.9 °C)] 98.4 °F (36.9 °C)   Temp Source: Temp src: Tympanic   BP: BP: (104-159)/(51-86) 108/51   Pulse: Heart Rate:  [] 90   Respirations: Resp:  [18] 18   Weight:       Admit Height:  Height: 157.5 cm (62\")    Physical Exam:  General:  no acute distresss.  Abdomen: Fundus: appropriate, firm, non tender  Extremities: normal, atraumatic, no cyanosis, and trace edema.     [unfilled]       Lab Results   Component Value Date    ABO A 04/16/2020    RH Positive 04/16/2020        Lab Results   Component Value Date    HGB 12.5 04/16/2020    HCT 39.0 04/16/2020         Assessment/Plan   Active Problems:    Term pregnancy      Rachel Escobar is Day 1  post-partum  Vaginal, Spontaneous    .      Plan:  Continue current care.      Ewa Asif DO  4/18/2020  07:48    "

## 2020-04-19 VITALS
HEART RATE: 83 BPM | HEIGHT: 62 IN | WEIGHT: 170 LBS | OXYGEN SATURATION: 98 % | TEMPERATURE: 98.1 F | SYSTOLIC BLOOD PRESSURE: 109 MMHG | BODY MASS INDEX: 31.28 KG/M2 | DIASTOLIC BLOOD PRESSURE: 62 MMHG | RESPIRATION RATE: 18 BRPM

## 2020-04-19 RX ORDER — IBUPROFEN 800 MG/1
800 TABLET ORAL EVERY 8 HOURS SCHEDULED
Qty: 60 TABLET | Refills: 0 | Status: SHIPPED | OUTPATIENT
Start: 2020-04-19

## 2020-04-19 RX ADMIN — IBUPROFEN 800 MG: 800 TABLET, FILM COATED ORAL at 06:10

## 2020-04-19 RX ADMIN — DOCUSATE SODIUM 100 MG: 100 CAPSULE ORAL at 09:57

## 2020-04-19 RX ADMIN — PRENATAL VIT W/ FE FUMARATE-FA TAB 27-0.8 MG 1 TABLET: 27-0.8 TAB at 09:57

## 2020-04-19 NOTE — DISCHARGE SUMMARY
ASHLEY Moreno  Delivery Discharge Summary    Primary OB Clinician:     EDC: Estimated Date of Delivery: 20    Gestational Age:39w2d    Antepartum complications: none    Date of Delivery: 2020   Time of Delivery: 1:46 PM     Delivered By:  Marlon Marquez     Delivery Type: Vaginal, Spontaneous      Tubal Ligation: n/a    Baby:male  infant;   Apgar:  8   @ 1 minute /   Apgar:  9   @ 5 minutes   Weight: 2984 g (6 lb 9.3 oz)      Anesthesia: Epidural      Intrapartum complications: None    [unfilled]       Lab Results   Component Value Date    ABO A 2020    RH Positive 2020        Lab Results   Component Value Date    HGB 11.9 (L) 2020    HCT 35.4 2020         Discharge Date: 2020; Discharge Time: 11:46        Plan:    Follow-up appointment with [facility] in 3 weeks.      Saniya Be DO  2020  11:46

## 2020-04-19 NOTE — NURSING NOTE
To Whom it May concern,     Brittany Escobar was at the [facility] with his wife Rachel Escobar for the birth of their son since the night of 4/16/2020. Mother and child are still currently patients as of 4/18/2020.   If there is any questions you can call [phone] and ask to speak to Women's Health.     Thank you,  Anika Chau RN

## 2020-04-19 NOTE — PLAN OF CARE
Vital signs stable, no signs of distress noted. Pain well controlled with just motrin. Bleeding WNL, fundus firm, voiding without any difficult.